# Patient Record
Sex: FEMALE | Race: BLACK OR AFRICAN AMERICAN | NOT HISPANIC OR LATINO | Employment: OTHER | URBAN - METROPOLITAN AREA
[De-identification: names, ages, dates, MRNs, and addresses within clinical notes are randomized per-mention and may not be internally consistent; named-entity substitution may affect disease eponyms.]

---

## 2017-09-12 ENCOUNTER — TELEPHONE (OUTPATIENT)
Dept: UROLOGY | Facility: CLINIC | Age: 62
End: 2017-09-12

## 2017-09-12 NOTE — TELEPHONE ENCOUNTER
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      Please call and advise.

## 2017-09-27 ENCOUNTER — OFFICE VISIT (OUTPATIENT)
Dept: UROLOGY | Facility: CLINIC | Age: 62
End: 2017-09-27
Payer: MEDICARE

## 2017-09-27 VITALS
SYSTOLIC BLOOD PRESSURE: 127 MMHG | DIASTOLIC BLOOD PRESSURE: 62 MMHG | WEIGHT: 166 LBS | BODY MASS INDEX: 29.41 KG/M2 | HEIGHT: 63 IN

## 2017-09-27 DIAGNOSIS — N39.3 SUI (STRESS URINARY INCONTINENCE, FEMALE): ICD-10-CM

## 2017-09-27 DIAGNOSIS — N32.81 OAB (OVERACTIVE BLADDER): ICD-10-CM

## 2017-09-27 DIAGNOSIS — R39.15 URINARY URGENCY: ICD-10-CM

## 2017-09-27 DIAGNOSIS — R35.0 URINARY FREQUENCY: ICD-10-CM

## 2017-09-27 DIAGNOSIS — N95.2 ATROPHIC VAGINITIS: ICD-10-CM

## 2017-09-27 DIAGNOSIS — R10.2 PELVIC PAIN IN FEMALE: ICD-10-CM

## 2017-09-27 DIAGNOSIS — N81.11 MIDLINE CYSTOCELE: ICD-10-CM

## 2017-09-27 PROBLEM — O26.899 PELVIC PAIN AFFECTING PREGNANCY: Status: ACTIVE | Noted: 2017-09-27

## 2017-09-27 PROCEDURE — 3008F BODY MASS INDEX DOCD: CPT | Mod: S$GLB,,, | Performed by: UROLOGY

## 2017-09-27 PROCEDURE — 99204 OFFICE O/P NEW MOD 45 MIN: CPT | Mod: S$GLB,,, | Performed by: UROLOGY

## 2017-09-27 PROCEDURE — 99999 PR PBB SHADOW E&M-EST. PATIENT-LVL III: CPT | Mod: PBBFAC,,, | Performed by: UROLOGY

## 2017-09-27 RX ORDER — BRIMONIDINE TARTRATE 2 MG/ML
SOLUTION/ DROPS OPHTHALMIC
COMMUNITY
Start: 2017-09-19 | End: 2022-03-08

## 2017-09-27 RX ORDER — ACETAZOLAMIDE 500 MG/1
CAPSULE, EXTENDED RELEASE ORAL
COMMUNITY
Start: 2017-09-19 | End: 2022-03-08

## 2017-09-27 RX ORDER — SOLIFENACIN SUCCINATE 5 MG/1
5 TABLET, FILM COATED ORAL DAILY
Qty: 30 TABLET | Refills: 11 | Status: SHIPPED | OUTPATIENT
Start: 2017-09-27 | End: 2022-03-08

## 2017-09-27 RX ORDER — CHOLECALCIFEROL (VITAMIN D3) 25 MCG
2000 TABLET ORAL DAILY
COMMUNITY

## 2017-09-27 RX ORDER — DORZOLAMIDE HCL 20 MG/ML
1 SOLUTION/ DROPS OPHTHALMIC 3 TIMES DAILY
COMMUNITY
End: 2022-03-08

## 2017-09-27 RX ORDER — ASPIRIN 81 MG/1
81 TABLET ORAL DAILY
COMMUNITY
End: 2022-06-17

## 2017-09-27 RX ORDER — ESTRADIOL 0.1 MG/G
1 CREAM VAGINAL DAILY
Qty: 42.5 G | Refills: 11 | Status: SHIPPED | OUTPATIENT
Start: 2017-09-27 | End: 2022-03-08

## 2017-09-27 NOTE — PROGRESS NOTES
Subjective:       Patient ID: Carina Rios is a 61 y.o. female.    Chief Complaint: Urinary Tract Infection    60 yo BF with 2-3 month history of urgency, vaginal discomfort and frequency. Here for evaluation.      Urinary Tract Infection    This is a new problem. The current episode started more than 1 month ago. The problem has been waxing and waning. The quality of the pain is described as burning. The pain is at a severity of 3/10. The pain is mild. There has been no fever. She is sexually active. There is no history of pyelonephritis. Associated symptoms include frequency, urgency and constipation. Pertinent negatives include no behavior changes, chills, discharge, flank pain, hematuria, hesitancy, nausea, possible pregnancy, sweats, vomiting, weight loss, bubble bath use, rash or withholding. She has tried antibiotics for the symptoms. The treatment provided moderate relief. There is no history of catheterization, diabetes insipidus, diabetes mellitus, genitourinary reflux, hypertension, kidney stones, recurrent UTIs, a single kidney, STD, urinary stasis or a urological procedure.     Review of Systems   Constitutional: Negative for activity change, appetite change, chills, fatigue, fever and weight loss.   HENT: Negative for congestion, ear pain, hearing loss, nosebleeds, sinus pressure, sore throat and trouble swallowing.    Eyes: Negative for pain and visual disturbance.   Respiratory: Negative for apnea, cough and shortness of breath.    Cardiovascular: Negative for chest pain and leg swelling.   Gastrointestinal: Positive for constipation. Negative for abdominal distention, abdominal pain, anal bleeding, blood in stool, diarrhea, nausea, rectal pain and vomiting.   Endocrine: Negative for cold intolerance, heat intolerance, polydipsia, polyphagia and polyuria.   Genitourinary: Positive for frequency and urgency. Negative for decreased urine volume, difficulty urinating, dyspareunia, dysuria, enuresis,  flank pain, genital sores, hematuria, hesitancy, menstrual problem, pelvic pain, vaginal bleeding, vaginal discharge and vaginal pain.   Musculoskeletal: Negative for arthralgias and back pain.   Skin: Negative for color change, pallor and rash.   Allergic/Immunologic: Negative for environmental allergies, food allergies and immunocompromised state.   Neurological: Negative for dizziness, speech difficulty, weakness and headaches.   Hematological: Negative for adenopathy. Does not bruise/bleed easily.   Psychiatric/Behavioral: Negative.        Objective:      Physical Exam   Nursing note and vitals reviewed.  Constitutional: She is oriented to person, place, and time. She appears well-developed and well-nourished.   HENT:   Head: Normocephalic.   Nose: Nose normal.   Mouth/Throat: Oropharynx is clear and moist.   Eyes: Conjunctivae and EOM are normal. Pupils are equal, round, and reactive to light.   Neck: Normal range of motion. Neck supple.   Cardiovascular: Normal rate, regular rhythm, normal heart sounds and intact distal pulses.    Pulmonary/Chest: Effort normal and breath sounds normal.   Abdominal: Soft. Bowel sounds are normal.   Genitourinary: No breast swelling, tenderness, discharge or bleeding. No labial fusion. There is no rash, tenderness, lesion or injury on the right labia. There is no rash, tenderness, lesion or injury on the left labia. Right adnexum displays no mass, no tenderness and no fullness. Left adnexum displays no mass, no tenderness and no fullness. No erythema, tenderness or bleeding in the vagina. No foreign body in the vagina. No signs of injury around the vagina. No vaginal discharge found.   Genitourinary Comments: S/P KADEN and BSO, Atrophic vaginitis with Grade 2 cystocele.   Musculoskeletal: Normal range of motion.   Neurological: She is alert and oriented to person, place, and time. She has normal reflexes.   Skin: Skin is warm and dry.     Psychiatric: She has a normal mood and  affect. Her behavior is normal. Judgment and thought content normal.       Assessment:       1. Pelvic pain in female    2. Urinary frequency    3. Urinary urgency    4. ML (stress urinary incontinence, female)    5. Midline cystocele    6. Atrophic vaginitis    7. OAB (overactive bladder)        Plan:       Patient Instructions   Trial of Estrace  Trial of Vesicare  F/U 6 weeks

## 2018-04-17 DIAGNOSIS — Z12.31 SCREENING MAMMOGRAM, ENCOUNTER FOR: Primary | ICD-10-CM

## 2018-04-18 ENCOUNTER — HOSPITAL ENCOUNTER (OUTPATIENT)
Dept: RADIOLOGY | Facility: HOSPITAL | Age: 63
Discharge: HOME | End: 2018-04-18
Attending: OBSTETRICS & GYNECOLOGY
Payer: MEDICARE

## 2018-04-18 DIAGNOSIS — Z12.31 SCREENING MAMMOGRAM, ENCOUNTER FOR: ICD-10-CM

## 2018-04-18 PROCEDURE — 77067 SCR MAMMO BI INCL CAD: CPT | Mod: TC

## 2018-04-18 PROCEDURE — 77063 BREAST TOMOSYNTHESIS BI: CPT | Mod: 26,,, | Performed by: RADIOLOGY

## 2018-04-18 PROCEDURE — 77067 SCR MAMMO BI INCL CAD: CPT | Mod: 26,,, | Performed by: RADIOLOGY

## 2019-11-30 ENCOUNTER — OFFICE VISIT (OUTPATIENT)
Dept: URGENT CARE | Facility: CLINIC | Age: 64
End: 2019-11-30
Payer: MEDICARE

## 2019-11-30 VITALS
HEART RATE: 69 BPM | BODY MASS INDEX: 30.48 KG/M2 | HEIGHT: 63 IN | SYSTOLIC BLOOD PRESSURE: 138 MMHG | OXYGEN SATURATION: 99 % | DIASTOLIC BLOOD PRESSURE: 68 MMHG | WEIGHT: 172 LBS | TEMPERATURE: 96 F

## 2019-11-30 DIAGNOSIS — M54.6 ACUTE RIGHT-SIDED THORACIC BACK PAIN: Primary | ICD-10-CM

## 2019-11-30 LAB
BILIRUB UR QL STRIP: POSITIVE
GLUCOSE UR QL STRIP: NEGATIVE
KETONES UR QL STRIP: NEGATIVE
LEUKOCYTE ESTERASE UR QL STRIP: NEGATIVE
PH, POC UA: 7.5
POC BLOOD, URINE: NEGATIVE
POC NITRATES, URINE: NEGATIVE
PROT UR QL STRIP: NEGATIVE
SP GR UR STRIP: 1.01 (ref 1–1.03)
UROBILINOGEN UR STRIP-ACNC: ABNORMAL (ref 0.1–1.1)

## 2019-11-30 PROCEDURE — 99214 OFFICE O/P EST MOD 30 MIN: CPT | Mod: 25,S$GLB,, | Performed by: NURSE PRACTITIONER

## 2019-11-30 PROCEDURE — 81003 POCT URINALYSIS, DIPSTICK, AUTOMATED, W/O SCOPE: ICD-10-PCS | Mod: QW,S$GLB,, | Performed by: NURSE PRACTITIONER

## 2019-11-30 PROCEDURE — 99214 PR OFFICE/OUTPT VISIT, EST, LEVL IV, 30-39 MIN: ICD-10-PCS | Mod: 25,S$GLB,, | Performed by: NURSE PRACTITIONER

## 2019-11-30 PROCEDURE — 96372 PR INJECTION,THERAP/PROPH/DIAG2ST, IM OR SUBCUT: ICD-10-PCS | Mod: S$GLB,,, | Performed by: NURSE PRACTITIONER

## 2019-11-30 PROCEDURE — 81003 URINALYSIS AUTO W/O SCOPE: CPT | Mod: QW,S$GLB,, | Performed by: NURSE PRACTITIONER

## 2019-11-30 PROCEDURE — 96372 THER/PROPH/DIAG INJ SC/IM: CPT | Mod: S$GLB,,, | Performed by: NURSE PRACTITIONER

## 2019-11-30 RX ORDER — KETOROLAC TROMETHAMINE 30 MG/ML
30 INJECTION, SOLUTION INTRAMUSCULAR; INTRAVENOUS
Status: COMPLETED | OUTPATIENT
Start: 2019-11-30 | End: 2019-11-30

## 2019-11-30 RX ORDER — METHOCARBAMOL 500 MG/1
500 TABLET, FILM COATED ORAL 3 TIMES DAILY
Qty: 21 TABLET | Refills: 0 | Status: SHIPPED | OUTPATIENT
Start: 2019-11-30 | End: 2019-12-07

## 2019-11-30 RX ORDER — NAPROXEN 500 MG/1
500 TABLET ORAL 2 TIMES DAILY WITH MEALS
Qty: 20 TABLET | Refills: 0 | Status: SHIPPED | OUTPATIENT
Start: 2019-11-30 | End: 2019-12-10

## 2019-11-30 RX ORDER — BRIMONIDINE TARTRATE 1 MG/ML
SOLUTION/ DROPS OPHTHALMIC
Refills: 3 | COMMUNITY
Start: 2019-09-02 | End: 2022-03-08 | Stop reason: ALTCHOICE

## 2019-11-30 RX ORDER — AMLODIPINE BESYLATE 5 MG/1
5 TABLET ORAL DAILY
Refills: 2 | COMMUNITY
Start: 2019-09-26 | End: 2022-06-17

## 2019-11-30 RX ORDER — PREDNISOLONE ACETATE 10 MG/ML
SUSPENSION/ DROPS OPHTHALMIC
Refills: 1 | COMMUNITY
Start: 2019-11-05 | End: 2022-03-08

## 2019-11-30 RX ADMIN — KETOROLAC TROMETHAMINE 30 MG: 30 INJECTION, SOLUTION INTRAMUSCULAR; INTRAVENOUS at 02:11

## 2019-11-30 NOTE — PROGRESS NOTES
"Subjective:       Patient ID: Carina Rios is a 64 y.o. female.    Vitals:  height is 5' 3" (1.6 m) and weight is 78 kg (172 lb). Her temperature is 96.4 °F (35.8 °C). Her blood pressure is 138/68 and her pulse is 69. Her oxygen saturation is 99%.     Chief Complaint: Back Pain    Back Pain   The current episode started in the past 7 days. The problem occurs constantly. The problem has been gradually worsening since onset. The pain is present in the thoracic spine. The quality of the pain is described as burning. The pain is at a severity of 8/10. The pain is moderate. The pain is worse during the night. The symptoms are aggravated by twisting, bending, lying down and sitting. Stiffness is present at night. Treatments tried: biofreeze and ibuprofen. The treatment provided mild relief.       Respiratory: Positive for chest tightness.    Musculoskeletal: Positive for back pain (middle back radiating from under rib cage).       Objective:      Physical Exam   Constitutional: She is oriented to person, place, and time. Vital signs are normal. She appears well-developed and well-nourished. She is active and cooperative. No distress.   HENT:   Head: Normocephalic and atraumatic.   Nose: Nose normal.   Eyes: Conjunctivae and lids are normal.   Cardiovascular: Normal rate, regular rhythm, normal heart sounds, intact distal pulses and normal pulses.   Pulmonary/Chest: Effort normal and breath sounds normal.   Musculoskeletal: She exhibits no edema or deformity.        Thoracic back: She exhibits decreased range of motion, tenderness (TTP, see diagram), pain and spasm. She exhibits no bony tenderness, no swelling, no edema, no deformity, no laceration and normal pulse.        Back:    Neurological: She is alert and oriented to person, place, and time. She has normal strength and normal reflexes. She is not disoriented. No sensory deficit.   Skin: Skin is warm, dry, intact and not diaphoretic.   Psychiatric: She has a " normal mood and affect. Her speech is normal and behavior is normal. Judgment and thought content normal. Cognition and memory are normal.   Nursing note and vitals reviewed.        Assessment:       1. Acute right-sided thoracic back pain        Plan:         Acute right-sided thoracic back pain  -     POCT Urinalysis, Dipstick, Automated, W/O Scope  -     ketorolac injection 30 mg  -     naproxen (NAPROSYN) 500 MG tablet; Take 1 tablet (500 mg total) by mouth 2 (two) times daily with meals. As needed for pain. for 10 days  Dispense: 20 tablet; Refill: 0  -     methocarbamol (ROBAXIN) 500 MG Tab; Take 1 tablet (500 mg total) by mouth 3 (three) times daily. As needed for muscle spasm. May cause drowsiness for 7 days  Dispense: 21 tablet; Refill: 0      Patient Instructions   Please follow up with your Primary care provider within 2-5 days if your signs and symptoms have not resolved or worsen.     If your condition worsens or fails to improve we recommend that you receive another evaluation at the emergency room immediately or contact your primary medical clinic to discuss your concerns.    You must understand that you have received an Urgent Care treatment only and that you may be released before all of your medical problems are known or treated.   You, the patient, will arrange for follow up care as instructed.     ORTHO    R.I.C.E. to the affected joint or limb as needed:    Rest- the injured or sore extremity.  Ice- for the next 24-48 hours, alternating 20 minutes on and 20 minutes off as needed up to 3 times a day.   Compression-Use bandages to stabilize injured limb.  Check circulation to make sure  bandage is not too tight.    Elevate-when possible to reduce swelling.      Ice 20 minutes on and 20 minutes off as needed for pain.     Please drink plenty of fluids.    Please get plenty of rest.    Please return here or go to the Emergency Department for any concerns or worsening of condition.    Please be aware  that narcotics can be addictive. If I gave you narcotics, I have given you a limited quantity to take as it is needed at this time. However take it sparingly and only when needed.  Do not operate machinery or drive on this medication.      If you were not prescribed an anti-inflammatory medication, and if you do not have any history of stomach/intestinal ulcers, or kidney disease, or are not taking a blood thinner such as Coumadin, Plavix, Pradaxa, Eloquis, or Xaralta for example, it is OK to take over the counter Ibuprofen or Advil or Motrin or Aleve as directed.  Do not take these medications on an empty stomach.    If you were given a splint wear it at all times unless otherwise instructed.     If you were given crutches use them as we instructed. Do not rest your armpits on the foam pad or you risk compressing the nerves and the vessels there.    Please follow up with your primary care doctor or specialist as needed.    If you lose control of your bowel and/or bladder, please go to the nearest Emergency Department immediately.  If you lose sensation in between your legs by your genitalia and/or rectum, please go to the nearest Emergency Department immediately.  If you lose control or sensation of any extremity, please go to the nearest Emergency Department immediately.      Back Sprain or Strain    Injury to the muscles (strain) or ligaments (sprain) around the spine can be troubling. Injury may occur after a sudden forceful twisting or bending force such as in a car accident, after a simple awkward movement, or after lifting something heavy with poor body positioning. In any case, muscle spasm is often present and adds to the pain.  Thankfully, most people feel better in 1 to 2 weeks, and most of the rest in 1 to 2 months. Most people can remain active. Unless you had a forceful or traumatic physical injury such as a car accident or fall, X-rays may not be ordered for the first evaluation of a back sprain or  strain. If pain continues and does not respond to medical treatment, your healthcare provider may then order X-rays and other tests.  Home care  The following guidelines will help you care for your injury at home:  · When in bed, try to find a comfortable position. A firm mattress is best. Try lying flat on your back with pillows under your knees. You can also try lying on your side with your knees bent up toward your chest and a pillow between your knees.  · Don't sit for long periods. Try not to take long car rides or take other trips that have you sitting for a long time. This puts more stress on the lower back than standing or walking.  · During the first 24 to 72 hours after an injury or flare-up, apply an ice pack to the painful area for 20 minutes. Then remove it for 20 minutes. Do this for 60 to 90 minutes, or several times a day. This will reduce swelling and pain. Be sure to wrap the ice pack in a thin towel or plastic to protect your skin.  · You can start with ice, then switch to heat. Heat from a hot shower, hot bath, or heating pad reduces pain and works well for muscle spasms. Put heat on the painful area for 20 minutes, then remove for 20 minutes. Do this for 60 to 90 minutes, or several times a day. Do not use a heating pad while sleeping. It can burn the skin.  · You can alternate the ice and heat. Talk with your healthcare provider to find out the best treatment or therapy for your back pain.  · Therapeutic massage will help relax the back muscles without stretching them.  · Be aware of safe lifting methods. Do not lift anything over 15 pounds until all of the pain is gone.  Medicines  Talk to your healthcare provider before using medicines, especially if you have other health problems or are taking other medicines.  · You may use acetaminophen or ibuprofen to control pain, unless another pain medicine was prescribed. If you have chronic conditions like diabetes, liver or kidney disease, stomach  ulcers, or gastrointestinal bleeding, or are taking blood-thinner medicines, talk with your doctor before taking any medicines.  · Be careful if you are given prescription medicines, narcotics, or medicine for muscle spasm. They can cause drowsiness, and affect your coordination, reflexes, and judgment. Do not drive or operate heavy machinery when taking these types of medicines. Only take pain medicine as prescribed by your healthcare provider.  Follow-up care  Follow up with your healthcare provider, or as advised. You may need physical therapy or more tests if your symptoms get worse.  If you had X-rays your healthcare provider may be checking for any broken bones, breaks, or fractures. Bruises and sprains can sometimes hurt as much as a fracture. These injuries can take time to heal completely. If your symptoms dont improve or they get worse, talk with your healthcare provider. You may need a repeat X-ray or other tests.  Call 911  Call for emergency care if any of the following occur:  · Trouble breathing  · Confused  · Very drowsy or trouble awakening  · Fainting or loss of consciousness  · Rapid or very slow heart rate  · Loss of bowel or bladder control  When to seek medical advice  Call your healthcare provider right away if any of the following occur:  · Pain gets worse or spreads to your arms or legs  · Weakness or numbness in one or both arms or legs  · Numbness in the groin or genital area  Date Last Reviewed: 6/1/2016  © 0334-6133 [employer]. [address] [place] [address]. All rights reserved. This information is not intended as a substitute for professional medical care. Always follow your healthcare professional's instructions.

## 2019-11-30 NOTE — PATIENT INSTRUCTIONS
Please follow up with your Primary care provider within 2-5 days if your signs and symptoms have not resolved or worsen.     If your condition worsens or fails to improve we recommend that you receive another evaluation at the emergency room immediately or contact your primary medical clinic to discuss your concerns.    You must understand that you have received an Urgent Care treatment only and that you may be released before all of your medical problems are known or treated.   You, the patient, will arrange for follow up care as instructed.     ORTHO    R.I.C.E. to the affected joint or limb as needed:    Rest- the injured or sore extremity.  Ice- for the next 24-48 hours, alternating 20 minutes on and 20 minutes off as needed up to 3 times a day.   Compression-Use bandages to stabilize injured limb.  Check circulation to make sure  bandage is not too tight.    Elevate-when possible to reduce swelling.      Ice 20 minutes on and 20 minutes off as needed for pain.     Please drink plenty of fluids.    Please get plenty of rest.    Please return here or go to the Emergency Department for any concerns or worsening of condition.    Please be aware that narcotics can be addictive. If I gave you narcotics, I have given you a limited quantity to take as it is needed at this time. However take it sparingly and only when needed.  Do not operate machinery or drive on this medication.      If you were not prescribed an anti-inflammatory medication, and if you do not have any history of stomach/intestinal ulcers, or kidney disease, or are not taking a blood thinner such as Coumadin, Plavix, Pradaxa, Eloquis, or Xaralta for example, it is OK to take over the counter Ibuprofen or Advil or Motrin or Aleve as directed.  Do not take these medications on an empty stomach.    If you were given a splint wear it at all times unless otherwise instructed.     If you were given crutches use them as we instructed. Do not rest your armpits  on the foam pad or you risk compressing the nerves and the vessels there.    Please follow up with your primary care doctor or specialist as needed.    If you lose control of your bowel and/or bladder, please go to the nearest Emergency Department immediately.  If you lose sensation in between your legs by your genitalia and/or rectum, please go to the nearest Emergency Department immediately.  If you lose control or sensation of any extremity, please go to the nearest Emergency Department immediately.      Back Sprain or Strain    Injury to the muscles (strain) or ligaments (sprain) around the spine can be troubling. Injury may occur after a sudden forceful twisting or bending force such as in a car accident, after a simple awkward movement, or after lifting something heavy with poor body positioning. In any case, muscle spasm is often present and adds to the pain.  Thankfully, most people feel better in 1 to 2 weeks, and most of the rest in 1 to 2 months. Most people can remain active. Unless you had a forceful or traumatic physical injury such as a car accident or fall, X-rays may not be ordered for the first evaluation of a back sprain or strain. If pain continues and does not respond to medical treatment, your healthcare provider may then order X-rays and other tests.  Home care  The following guidelines will help you care for your injury at home:  · When in bed, try to find a comfortable position. A firm mattress is best. Try lying flat on your back with pillows under your knees. You can also try lying on your side with your knees bent up toward your chest and a pillow between your knees.  · Don't sit for long periods. Try not to take long car rides or take other trips that have you sitting for a long time. This puts more stress on the lower back than standing or walking.  · During the first 24 to 72 hours after an injury or flare-up, apply an ice pack to the painful area for 20 minutes. Then remove it for 20  minutes. Do this for 60 to 90 minutes, or several times a day. This will reduce swelling and pain. Be sure to wrap the ice pack in a thin towel or plastic to protect your skin.  · You can start with ice, then switch to heat. Heat from a hot shower, hot bath, or heating pad reduces pain and works well for muscle spasms. Put heat on the painful area for 20 minutes, then remove for 20 minutes. Do this for 60 to 90 minutes, or several times a day. Do not use a heating pad while sleeping. It can burn the skin.  · You can alternate the ice and heat. Talk with your healthcare provider to find out the best treatment or therapy for your back pain.  · Therapeutic massage will help relax the back muscles without stretching them.  · Be aware of safe lifting methods. Do not lift anything over 15 pounds until all of the pain is gone.  Medicines  Talk to your healthcare provider before using medicines, especially if you have other health problems or are taking other medicines.  · You may use acetaminophen or ibuprofen to control pain, unless another pain medicine was prescribed. If you have chronic conditions like diabetes, liver or kidney disease, stomach ulcers, or gastrointestinal bleeding, or are taking blood-thinner medicines, talk with your doctor before taking any medicines.  · Be careful if you are given prescription medicines, narcotics, or medicine for muscle spasm. They can cause drowsiness, and affect your coordination, reflexes, and judgment. Do not drive or operate heavy machinery when taking these types of medicines. Only take pain medicine as prescribed by your healthcare provider.  Follow-up care  Follow up with your healthcare provider, or as advised. You may need physical therapy or more tests if your symptoms get worse.  If you had X-rays your healthcare provider may be checking for any broken bones, breaks, or fractures. Bruises and sprains can sometimes hurt as much as a fracture. These injuries can take time  to heal completely. If your symptoms dont improve or they get worse, talk with your healthcare provider. You may need a repeat X-ray or other tests.  Call 911  Call for emergency care if any of the following occur:  · Trouble breathing  · Confused  · Very drowsy or trouble awakening  · Fainting or loss of consciousness  · Rapid or very slow heart rate  · Loss of bowel or bladder control  When to seek medical advice  Call your healthcare provider right away if any of the following occur:  · Pain gets worse or spreads to your arms or legs  · Weakness or numbness in one or both arms or legs  · Numbness in the groin or genital area  Date Last Reviewed: 6/1/2016  © 4935-6350 [employer]. [address] [place] [address], [place] [address]. All rights reserved. This information is not intended as a substitute for professional medical care. Always follow your healthcare professional's instructions.

## 2019-12-03 ENCOUNTER — TELEPHONE (OUTPATIENT)
Dept: URGENT CARE | Facility: CLINIC | Age: 64
End: 2019-12-03

## 2021-03-08 ENCOUNTER — CLINICAL SUPPORT (OUTPATIENT)
Dept: SMOKING CESSATION | Facility: CLINIC | Age: 66
End: 2021-03-08
Payer: COMMERCIAL

## 2021-03-08 DIAGNOSIS — F17.210 CIGARETTE SMOKER: Primary | ICD-10-CM

## 2021-03-08 PROCEDURE — 99407 PR TOBACCO USE CESSATION INTENSIVE >10 MINUTES: ICD-10-PCS | Mod: S$GLB,,,

## 2021-03-08 PROCEDURE — 99999 PR PBB SHADOW E&M-EST. PATIENT-LVL I: CPT | Mod: PBBFAC,,,

## 2021-03-08 PROCEDURE — 99999 PR PBB SHADOW E&M-EST. PATIENT-LVL I: ICD-10-PCS | Mod: PBBFAC,,,

## 2021-03-08 PROCEDURE — 99407 BEHAV CHNG SMOKING > 10 MIN: CPT | Mod: S$GLB,,,

## 2021-03-25 ENCOUNTER — TELEPHONE (OUTPATIENT)
Dept: SMOKING CESSATION | Facility: CLINIC | Age: 66
End: 2021-03-25

## 2021-04-13 PROCEDURE — 96361 HYDRATE IV INFUSION ADD-ON: CPT

## 2021-04-13 PROCEDURE — 99285 EMERGENCY DEPT VISIT HI MDM: CPT | Mod: 25

## 2021-04-13 PROCEDURE — 96374 THER/PROPH/DIAG INJ IV PUSH: CPT

## 2021-04-14 ENCOUNTER — HOSPITAL ENCOUNTER (EMERGENCY)
Facility: HOSPITAL | Age: 66
Discharge: HOME | End: 2021-04-14
Attending: EMERGENCY MEDICINE
Payer: MEDICARE

## 2021-04-14 VITALS
TEMPERATURE: 98 F | RESPIRATION RATE: 18 BRPM | HEART RATE: 71 BPM | BODY MASS INDEX: 27.66 KG/M2 | WEIGHT: 162 LBS | HEIGHT: 64 IN | OXYGEN SATURATION: 99 % | SYSTOLIC BLOOD PRESSURE: 133 MMHG | DIASTOLIC BLOOD PRESSURE: 66 MMHG

## 2021-04-14 DIAGNOSIS — R19.7 VOMITING AND DIARRHEA: ICD-10-CM

## 2021-04-14 DIAGNOSIS — R42 ORTHOSTATIC DIZZINESS: Primary | ICD-10-CM

## 2021-04-14 DIAGNOSIS — R55 SYNCOPE: ICD-10-CM

## 2021-04-14 DIAGNOSIS — R11.10 VOMITING AND DIARRHEA: ICD-10-CM

## 2021-04-14 LAB
ALBUMIN SERPL BCP-MCNC: 4.4 G/DL (ref 3.5–5.2)
ALP SERPL-CCNC: 103 U/L (ref 55–135)
ALT SERPL W/O P-5'-P-CCNC: 23 U/L (ref 10–44)
ANION GAP SERPL CALC-SCNC: 11 MMOL/L (ref 8–16)
AST SERPL-CCNC: 23 U/L (ref 10–40)
BASOPHILS # BLD AUTO: 0.04 K/UL (ref 0–0.2)
BASOPHILS NFR BLD: 0.4 % (ref 0–1.9)
BILIRUB SERPL-MCNC: 0.3 MG/DL (ref 0.1–1)
BILIRUB UR QL STRIP: NEGATIVE
BNP SERPL-MCNC: 12 PG/ML (ref 0–99)
BUN SERPL-MCNC: 14 MG/DL (ref 8–23)
CALCIUM SERPL-MCNC: 9.7 MG/DL (ref 8.7–10.5)
CHLORIDE SERPL-SCNC: 106 MMOL/L (ref 95–110)
CLARITY UR: CLEAR
CO2 SERPL-SCNC: 23 MMOL/L (ref 23–29)
COLOR UR: YELLOW
CREAT SERPL-MCNC: 0.8 MG/DL (ref 0.5–1.4)
DIFFERENTIAL METHOD: ABNORMAL
EOSINOPHIL # BLD AUTO: 0 K/UL (ref 0–0.5)
EOSINOPHIL NFR BLD: 0.3 % (ref 0–8)
ERYTHROCYTE [DISTWIDTH] IN BLOOD BY AUTOMATED COUNT: 12.8 % (ref 11.5–14.5)
EST. GFR  (AFRICAN AMERICAN): >60 ML/MIN/1.73 M^2
EST. GFR  (NON AFRICAN AMERICAN): >60 ML/MIN/1.73 M^2
GLUCOSE SERPL-MCNC: 115 MG/DL (ref 70–110)
GLUCOSE UR QL STRIP: NEGATIVE
HCT VFR BLD AUTO: 41.8 % (ref 37–48.5)
HGB BLD-MCNC: 13.5 G/DL (ref 12–16)
HGB UR QL STRIP: NEGATIVE
IMM GRANULOCYTES # BLD AUTO: 0.04 K/UL (ref 0–0.04)
IMM GRANULOCYTES NFR BLD AUTO: 0.4 % (ref 0–0.5)
KETONES UR QL STRIP: NEGATIVE
LEUKOCYTE ESTERASE UR QL STRIP: NEGATIVE
LYMPHOCYTES # BLD AUTO: 1.5 K/UL (ref 1–4.8)
LYMPHOCYTES NFR BLD: 13.5 % (ref 18–48)
MCH RBC QN AUTO: 31.6 PG (ref 27–31)
MCHC RBC AUTO-ENTMCNC: 32.3 G/DL (ref 32–36)
MCV RBC AUTO: 98 FL (ref 82–98)
MONOCYTES # BLD AUTO: 0.3 K/UL (ref 0.3–1)
MONOCYTES NFR BLD: 3.1 % (ref 4–15)
NEUTROPHILS # BLD AUTO: 8.9 K/UL (ref 1.8–7.7)
NEUTROPHILS NFR BLD: 82.3 % (ref 38–73)
NITRITE UR QL STRIP: NEGATIVE
NRBC BLD-RTO: 0 /100 WBC
PH UR STRIP: 6 [PH] (ref 5–8)
PLATELET # BLD AUTO: 167 K/UL (ref 150–450)
PMV BLD AUTO: 13.3 FL (ref 9.2–12.9)
POTASSIUM SERPL-SCNC: 4.3 MMOL/L (ref 3.5–5.1)
PROT SERPL-MCNC: 8.5 G/DL (ref 6–8.4)
PROT UR QL STRIP: NEGATIVE
RBC # BLD AUTO: 4.27 M/UL (ref 4–5.4)
SODIUM SERPL-SCNC: 140 MMOL/L (ref 136–145)
SP GR UR STRIP: 1.02 (ref 1–1.03)
TROPONIN I SERPL DL<=0.01 NG/ML-MCNC: <0.006 NG/ML (ref 0–0.03)
URN SPEC COLLECT METH UR: NORMAL
UROBILINOGEN UR STRIP-ACNC: NEGATIVE EU/DL
WBC # BLD AUTO: 10.8 K/UL (ref 3.9–12.7)

## 2021-04-14 PROCEDURE — 93005 ELECTROCARDIOGRAM TRACING: CPT

## 2021-04-14 PROCEDURE — 63600175 PHARM REV CODE 636 W HCPCS: Performed by: EMERGENCY MEDICINE

## 2021-04-14 PROCEDURE — 85025 COMPLETE CBC W/AUTO DIFF WBC: CPT | Performed by: PHYSICIAN ASSISTANT

## 2021-04-14 PROCEDURE — 80053 COMPREHEN METABOLIC PANEL: CPT | Performed by: PHYSICIAN ASSISTANT

## 2021-04-14 PROCEDURE — 83880 ASSAY OF NATRIURETIC PEPTIDE: CPT | Performed by: PHYSICIAN ASSISTANT

## 2021-04-14 PROCEDURE — 81003 URINALYSIS AUTO W/O SCOPE: CPT | Performed by: PHYSICIAN ASSISTANT

## 2021-04-14 PROCEDURE — 84484 ASSAY OF TROPONIN QUANT: CPT | Performed by: PHYSICIAN ASSISTANT

## 2021-04-14 PROCEDURE — 93010 EKG 12-LEAD: ICD-10-PCS | Mod: ,,, | Performed by: INTERNAL MEDICINE

## 2021-04-14 PROCEDURE — 93010 ELECTROCARDIOGRAM REPORT: CPT | Mod: ,,, | Performed by: INTERNAL MEDICINE

## 2021-04-14 PROCEDURE — 25000003 PHARM REV CODE 250: Performed by: EMERGENCY MEDICINE

## 2021-04-14 RX ORDER — ACETAMINOPHEN 500 MG
1000 TABLET ORAL
Status: COMPLETED | OUTPATIENT
Start: 2021-04-14 | End: 2021-04-14

## 2021-04-14 RX ORDER — DICYCLOMINE HYDROCHLORIDE 20 MG/1
20 TABLET ORAL 2 TIMES DAILY PRN
Qty: 10 TABLET | Refills: 0 | Status: SHIPPED | OUTPATIENT
Start: 2021-04-14 | End: 2021-04-19

## 2021-04-14 RX ORDER — ONDANSETRON 2 MG/ML
4 INJECTION INTRAMUSCULAR; INTRAVENOUS
Status: COMPLETED | OUTPATIENT
Start: 2021-04-14 | End: 2021-04-14

## 2021-04-14 RX ORDER — ONDANSETRON 4 MG/1
4 TABLET, ORALLY DISINTEGRATING ORAL EVERY 8 HOURS PRN
Qty: 12 TABLET | Refills: 0 | Status: SHIPPED | OUTPATIENT
Start: 2021-04-14 | End: 2022-03-08

## 2021-04-14 RX ADMIN — ONDANSETRON 4 MG: 2 INJECTION INTRAMUSCULAR; INTRAVENOUS at 02:04

## 2021-04-14 RX ADMIN — SODIUM CHLORIDE, SODIUM LACTATE, POTASSIUM CHLORIDE, AND CALCIUM CHLORIDE 1000 ML: .6; .31; .03; .02 INJECTION, SOLUTION INTRAVENOUS at 02:04

## 2021-04-14 RX ADMIN — ACETAMINOPHEN 1000 MG: 500 TABLET ORAL at 02:04

## 2021-04-21 ENCOUNTER — TELEPHONE (OUTPATIENT)
Dept: SMOKING CESSATION | Facility: CLINIC | Age: 66
End: 2021-04-21

## 2021-08-20 ENCOUNTER — LAB VISIT (OUTPATIENT)
Dept: PRIMARY CARE CLINIC | Facility: OTHER | Age: 66
End: 2021-08-20
Payer: MEDICARE

## 2021-08-20 DIAGNOSIS — R05.9 COUGH: ICD-10-CM

## 2021-08-20 PROCEDURE — U0003 INFECTIOUS AGENT DETECTION BY NUCLEIC ACID (DNA OR RNA); SEVERE ACUTE RESPIRATORY SYNDROME CORONAVIRUS 2 (SARS-COV-2) (CORONAVIRUS DISEASE [COVID-19]), AMPLIFIED PROBE TECHNIQUE, MAKING USE OF HIGH THROUGHPUT TECHNOLOGIES AS DESCRIBED BY CMS-2020-01-R: HCPCS | Performed by: NURSE PRACTITIONER

## 2021-08-23 LAB
SARS-COV-2 RNA RESP QL NAA+PROBE: NOT DETECTED
SARS-COV-2- CYCLE NUMBER: NORMAL

## 2022-03-08 ENCOUNTER — OFFICE VISIT (OUTPATIENT)
Dept: OPHTHALMOLOGY | Facility: CLINIC | Age: 67
End: 2022-03-08
Payer: MEDICARE

## 2022-03-08 DIAGNOSIS — Z96.1 PSEUDOPHAKIA OF BOTH EYES: ICD-10-CM

## 2022-03-08 DIAGNOSIS — Q15.0 JUVENILE GLAUCOMA: ICD-10-CM

## 2022-03-08 DIAGNOSIS — H40.1113 PRIMARY OPEN ANGLE GLAUCOMA (POAG) OF RIGHT EYE, SEVERE STAGE: ICD-10-CM

## 2022-03-08 DIAGNOSIS — H02.883 MEIBOMIAN GLAND DYSFUNCTION (MGD) OF BOTH EYES: ICD-10-CM

## 2022-03-08 DIAGNOSIS — H40.1123 PRIMARY OPEN ANGLE GLAUCOMA (POAG) OF LEFT EYE, SEVERE STAGE: Primary | ICD-10-CM

## 2022-03-08 DIAGNOSIS — H02.886 MEIBOMIAN GLAND DYSFUNCTION (MGD) OF BOTH EYES: ICD-10-CM

## 2022-03-08 PROCEDURE — 99999 PR PBB SHADOW E&M-EST. PATIENT-LVL II: ICD-10-PCS | Mod: PBBFAC,,, | Performed by: STUDENT IN AN ORGANIZED HEALTH CARE EDUCATION/TRAINING PROGRAM

## 2022-03-08 PROCEDURE — 76514 PR  US, EYE, FOR CORNEAL THICKNESS: ICD-10-PCS | Mod: S$GLB,,, | Performed by: STUDENT IN AN ORGANIZED HEALTH CARE EDUCATION/TRAINING PROGRAM

## 2022-03-08 PROCEDURE — 1160F PR REVIEW ALL MEDS BY PRESCRIBER/CLIN PHARMACIST DOCUMENTED: ICD-10-PCS | Mod: CPTII,S$GLB,, | Performed by: STUDENT IN AN ORGANIZED HEALTH CARE EDUCATION/TRAINING PROGRAM

## 2022-03-08 PROCEDURE — 99204 OFFICE O/P NEW MOD 45 MIN: CPT | Mod: S$GLB,,, | Performed by: STUDENT IN AN ORGANIZED HEALTH CARE EDUCATION/TRAINING PROGRAM

## 2022-03-08 PROCEDURE — 76514 ECHO EXAM OF EYE THICKNESS: CPT | Mod: S$GLB,,, | Performed by: STUDENT IN AN ORGANIZED HEALTH CARE EDUCATION/TRAINING PROGRAM

## 2022-03-08 PROCEDURE — 92020 PR SPECIAL EYE EVAL,GONIOSCOPY: ICD-10-PCS | Mod: S$GLB,,, | Performed by: STUDENT IN AN ORGANIZED HEALTH CARE EDUCATION/TRAINING PROGRAM

## 2022-03-08 PROCEDURE — 99204 PR OFFICE/OUTPT VISIT, NEW, LEVL IV, 45-59 MIN: ICD-10-PCS | Mod: S$GLB,,, | Performed by: STUDENT IN AN ORGANIZED HEALTH CARE EDUCATION/TRAINING PROGRAM

## 2022-03-08 PROCEDURE — 1159F PR MEDICATION LIST DOCUMENTED IN MEDICAL RECORD: ICD-10-PCS | Mod: CPTII,S$GLB,, | Performed by: STUDENT IN AN ORGANIZED HEALTH CARE EDUCATION/TRAINING PROGRAM

## 2022-03-08 PROCEDURE — 1159F MED LIST DOCD IN RCRD: CPT | Mod: CPTII,S$GLB,, | Performed by: STUDENT IN AN ORGANIZED HEALTH CARE EDUCATION/TRAINING PROGRAM

## 2022-03-08 PROCEDURE — 92020 GONIOSCOPY: CPT | Mod: S$GLB,,, | Performed by: STUDENT IN AN ORGANIZED HEALTH CARE EDUCATION/TRAINING PROGRAM

## 2022-03-08 PROCEDURE — 99999 PR PBB SHADOW E&M-EST. PATIENT-LVL II: CPT | Mod: PBBFAC,,, | Performed by: STUDENT IN AN ORGANIZED HEALTH CARE EDUCATION/TRAINING PROGRAM

## 2022-03-08 PROCEDURE — 1160F RVW MEDS BY RX/DR IN RCRD: CPT | Mod: CPTII,S$GLB,, | Performed by: STUDENT IN AN ORGANIZED HEALTH CARE EDUCATION/TRAINING PROGRAM

## 2022-03-08 RX ORDER — NETARSUDIL AND LATANOPROST OPHTHALMIC SOLUTION, 0.02%/0.005% .2; .05 MG/ML; MG/ML
1 SOLUTION/ DROPS OPHTHALMIC; TOPICAL NIGHTLY
Qty: 7 ML | Refills: 3 | Status: SHIPPED | OUTPATIENT
Start: 2022-03-08 | End: 2023-01-03

## 2022-03-08 RX ORDER — NETARSUDIL AND LATANOPROST OPHTHALMIC SOLUTION, 0.02%/0.005% .2; .05 MG/ML; MG/ML
SOLUTION/ DROPS OPHTHALMIC; TOPICAL
COMMUNITY
Start: 2021-12-28 | End: 2022-03-08 | Stop reason: SDUPTHER

## 2022-03-08 RX ORDER — DORZOLAMIDE HYDROCHLORIDE AND TIMOLOL MALEATE 20; 5 MG/ML; MG/ML
1 SOLUTION/ DROPS OPHTHALMIC 2 TIMES DAILY
Qty: 2 EACH | Refills: 3 | Status: SHIPPED | OUTPATIENT
Start: 2022-03-08 | End: 2022-05-12 | Stop reason: SDUPTHER

## 2022-03-08 NOTE — PROGRESS NOTES
Subjective:       Patient ID: Carina Rios is a 66 y.o. female.    Chief Complaint: Glaucoma  HPI     Pt was referred here today by Dr. Cool for a Glaucoma Evaluation.  She states she was first diagnosed with Glaucoma when she was in college.   She has been using eye drops since she was first diagnosed. She has had   multiple surgeries OU over the years for her Glaucoma. She has also had   Cataract surgery OU.    C/o redness and tearing.     Eye Meds:  Rocklatan QHS OU  Dorzolamide-Timolol BID OU    Last edited by Chelsey Seals MD on 3/8/2022 12:19 PM. (History)              Assessment & Plan   Primary open angle glaucoma (POAG) of left eye, severe stage  -     ROCKLATAN 0.02-0.005 % Drop; Place 1 drop into both eyes every evening.  Dispense: 7 mL; Refill: 3  -     dorzolamide-timolol 2-0.5% (COSOPT) 22.3-6.8 mg/mL ophthalmic solution; Place 1 drop into both eyes 2 (two) times daily.  Dispense: 2 each; Refill: 3    Primary open angle glaucoma (POAG) of right eye, severe stage  -     ROCKLATAN 0.02-0.005 % Drop; Place 1 drop into both eyes every evening.  Dispense: 7 mL; Refill: 3  -     dorzolamide-timolol 2-0.5% (COSOPT) 22.3-6.8 mg/mL ophthalmic solution; Place 1 drop into both eyes 2 (two) times daily.  Dispense: 2 each; Refill: 3    Pseudophakia of both eyes    Meibomian gland dysfunction (MGD) of both eyes    Juvenile glaucoma  -     ROCKLATAN 0.02-0.005 % Drop; Place 1 drop into both eyes every evening.  Dispense: 7 mL; Refill: 3  -     dorzolamide-timolol 2-0.5% (COSOPT) 22.3-6.8 mg/mL ophthalmic solution; Place 1 drop into both eyes 2 (two) times daily.  Dispense: 2 each; Refill: 3         Referral from Dr. Cool. Previously treated with Dr. Potter, Dr. Raymond.     POAG severe OU   JOAG - Dx'd at 20 y/o  -Fhx (+mtp family members), Steroids (-),Trauma(-)  -Drops: Rocklatan qHS OU // Dorzolamide-Timolol BID OU  -Drop intolerance/contraindication:  -Laser:  -Surgeries: Trab OU // IN BV OU // ST  AVG OS // CE IOL OU  -CCT: 527 // 482 thin  -Gonio: 3+ with sclerostomies and scattered PAS    Tm unclear    Do not have previous records --> get record release next visit  IOP may be OK today  Ok to CPM with gtts - Refill all medications - 90-day [employer] mail order  Discussed eyedrops and if more than one, recommend 5 minutes between all drops.         Pseudophakia  Lenses WC, monitor    Meibomian Gland Disease OU  Counseled and discussed oil glands are inspissated / obstructed causing symptomatic evaporative dry eye   Failed WC  Start OcuSoft lid wipes BID x 1 month, then daily thereafter    Red eye OU  Likely from Rocklatan  Can try Lumify OTC    PLAN  Continue  Rocklatan qHS OU // Dorzolamide-Timolol BID OU  Start OcuSoft lid wipes BID OU  Can try Lumify OTC    RTC 3 months with HVF24-2, OCT-RNFL, recheck APD    Chelsey Seals M.D., M.S.  Department of Ophthalmology   Division of Glaucoma Surgery  [facility]

## 2022-05-12 DIAGNOSIS — Q15.0 JUVENILE GLAUCOMA: ICD-10-CM

## 2022-05-12 DIAGNOSIS — H40.1113 PRIMARY OPEN ANGLE GLAUCOMA (POAG) OF RIGHT EYE, SEVERE STAGE: ICD-10-CM

## 2022-05-12 DIAGNOSIS — H40.1123 PRIMARY OPEN ANGLE GLAUCOMA (POAG) OF LEFT EYE, SEVERE STAGE: ICD-10-CM

## 2022-05-13 DIAGNOSIS — H40.1113 PRIMARY OPEN ANGLE GLAUCOMA (POAG) OF RIGHT EYE, SEVERE STAGE: ICD-10-CM

## 2022-05-13 DIAGNOSIS — Q15.0 JUVENILE GLAUCOMA: ICD-10-CM

## 2022-05-13 DIAGNOSIS — H40.1123 PRIMARY OPEN ANGLE GLAUCOMA (POAG) OF LEFT EYE, SEVERE STAGE: ICD-10-CM

## 2022-05-13 RX ORDER — DORZOLAMIDE HYDROCHLORIDE AND TIMOLOL MALEATE 20; 5 MG/ML; MG/ML
1 SOLUTION/ DROPS OPHTHALMIC 2 TIMES DAILY
Qty: 2 EACH | Refills: 2 | Status: SHIPPED | OUTPATIENT
Start: 2022-05-13 | End: 2023-01-03

## 2022-05-13 RX ORDER — DORZOLAMIDE HYDROCHLORIDE AND TIMOLOL MALEATE 20; 5 MG/ML; MG/ML
1 SOLUTION/ DROPS OPHTHALMIC 2 TIMES DAILY
Qty: 2 EACH | Refills: 2 | Status: SHIPPED | OUTPATIENT
Start: 2022-05-13 | End: 2022-05-13 | Stop reason: SDUPTHER

## 2022-06-16 NOTE — PROGRESS NOTES
Subjective:       Patient ID: Carina Rios is a 66 y.o. female.    Chief Complaint: Glaucoma (HVF and OCT review today )       HPI     Glaucoma     Comments: HVF and OCT review today               Comments     Eyes feel itchy and Red. Did use Lumify and it helps relieve redness for   4 hours. Not using every day. Feels like OS getting worse. Pt reports   macular edema OS in 2020 treated by Dr. Raymond. Pt reports she thinks   she got it after he glaucoma surgery. She says that she was treated with   steroid drops for this, but it made the IOP worse and the macular edema   did not improve. Decision was made to discontinue steroid drops. She says   she has not seen a retinal doctor for the macular edema.       DLS: 3/8/22    1. Severe POAG OU  2. PCIOL OU  3. MGD    MEDS:  Cosopt BID OU  Rocklatan QHS OU  Lumify PRN OU          Last edited by Chelsey Seals MD on 6/17/2022 11:55 AM. (History)            Assessment & Plan   Primary open angle glaucoma (POAG) of left eye, severe stage  -     Yates Visual Field - OU - Extended - Both Eyes  -     Posterior Segment OCT Optic Nerve- Both eyes    Primary open angle glaucoma (POAG) of right eye, severe stage  -     Yates Visual Field - OU - Extended - Both Eyes  -     Posterior Segment OCT Optic Nerve- Both eyes    Macular edema of left eye    Pseudophakia of both eyes    Meibomian gland dysfunction (MGD) of both eyes    Juvenile glaucoma       Referral from Dr. Cool. Previously treated with Dr. Potter, Dr. Raymond.     POAG severe OU   JOAG - Dx'd at 20 y/o  -Fhx (+mtp family members), Steroids (-),Trauma(-)  -Drops: Rocklatan qHS OU // Dorzolamide-Timolol BID OU ==> All medications - 90-day [employer] mail order  -Drop intolerance/contraindication:  -Laser:  -Surgeries: Trab OU // IN BV OU // ST AVG OS // CE IOL OU  -CCT: 527 // 482 thin  -Gonio: 3+ with sclerostomies and scattered PAS  Tm unclear    [facility] from [facility] -  NO HVF or OCT images in  records faxed    6/22 HVF Paracentral scotoma VFI 92% OD // severe depression VFI 0% OS  6/22 RNFL dec G/TI B T OD // dec G/TS/TI/NI/N B T/NS OS  Macula wnl OD // edema OS    HVF OD better than expected - has paracentral scotoma - want to repeat 6 months to see if artifact  Pt reports she thinks she can see in peripheral vision OS - Need to try HVF 24-2 stim V OS    IOP TOO HIGH OS TODAY  OK for 1 episode high, recheck and if still high will need additional IOP lowering  May need cyst over ahmed plate decompressed    Macular edema OS  Unclear etiology  Likely chronic - pt reports since ~2020  Steroid responder  Refer to retina for evaluation - possible FA to uncover if anti-VEGF would help  Unclear benefit of Tx given severity of glaucoma and chronicity of edema but do recommend evaluation in case any VA improvement       Allergic conjunctivitis OU  Mild but itchy  OTC allergy drop handout provided  Educated must use at least 1-2 x daily for 2 weeks for effectiveness      Pseudophakia  Lenses WC, monitor    Meibomian Gland Disease OU  Counseled and discussed oil glands are inspissated / obstructed causing symptomatic evaporative dry eye   Failed WC   OcuSoft lid wipes BID x 1 month, then daily thereafter    Red eye OU  Likely from Rocklatan  Lumify OTC working but patient only using once in a while  OK to use daily up to TID    PLAN  Continue  Rocklatan qHS OU // Dorzolamide-Timolol BID OU  Start OcuSoft lid wipes BID OU  Can try Lumify OTC    RTC 3 months with HVF24-2, OCT-RNFL, recheck APD    PLAN  Continue  Rocklatan qHS OU // Dorzolamide-Timolol BID OU  Ok to cont Lumify OTC for redness  Allergy drop handout provided for eye itchiness  Refer to retina     Need to try HVF 24-2 stim V OS    RTC 3 months IOP check     Chelsey Seals M.D., M.S.  Department of Ophthalmology   Division of Glaucoma Surgery  [facility]

## 2022-06-17 ENCOUNTER — OFFICE VISIT (OUTPATIENT)
Dept: OPHTHALMOLOGY | Facility: CLINIC | Age: 67
End: 2022-06-17
Payer: MEDICARE

## 2022-06-17 ENCOUNTER — CLINICAL SUPPORT (OUTPATIENT)
Dept: OPHTHALMOLOGY | Facility: CLINIC | Age: 67
End: 2022-06-17
Payer: MEDICARE

## 2022-06-17 DIAGNOSIS — H40.1123 PRIMARY OPEN ANGLE GLAUCOMA (POAG) OF LEFT EYE, SEVERE STAGE: Primary | ICD-10-CM

## 2022-06-17 DIAGNOSIS — H35.81 MACULAR EDEMA OF LEFT EYE: ICD-10-CM

## 2022-06-17 DIAGNOSIS — Q15.0 JUVENILE GLAUCOMA: ICD-10-CM

## 2022-06-17 DIAGNOSIS — H02.886 MEIBOMIAN GLAND DYSFUNCTION (MGD) OF BOTH EYES: ICD-10-CM

## 2022-06-17 DIAGNOSIS — H40.1113 PRIMARY OPEN ANGLE GLAUCOMA (POAG) OF RIGHT EYE, SEVERE STAGE: ICD-10-CM

## 2022-06-17 DIAGNOSIS — H02.883 MEIBOMIAN GLAND DYSFUNCTION (MGD) OF BOTH EYES: ICD-10-CM

## 2022-06-17 DIAGNOSIS — Z96.1 PSEUDOPHAKIA OF BOTH EYES: ICD-10-CM

## 2022-06-17 PROCEDURE — 1160F RVW MEDS BY RX/DR IN RCRD: CPT | Mod: CPTII,S$GLB,, | Performed by: STUDENT IN AN ORGANIZED HEALTH CARE EDUCATION/TRAINING PROGRAM

## 2022-06-17 PROCEDURE — 99214 OFFICE O/P EST MOD 30 MIN: CPT | Mod: S$GLB,,, | Performed by: STUDENT IN AN ORGANIZED HEALTH CARE EDUCATION/TRAINING PROGRAM

## 2022-06-17 PROCEDURE — 92083 HUMPHREY VISUAL FIELD - OU - BOTH EYES: ICD-10-PCS | Mod: S$GLB,,, | Performed by: STUDENT IN AN ORGANIZED HEALTH CARE EDUCATION/TRAINING PROGRAM

## 2022-06-17 PROCEDURE — 99999 PR PBB SHADOW E&M-EST. PATIENT-LVL II: ICD-10-PCS | Mod: PBBFAC,,, | Performed by: STUDENT IN AN ORGANIZED HEALTH CARE EDUCATION/TRAINING PROGRAM

## 2022-06-17 PROCEDURE — 99214 PR OFFICE/OUTPT VISIT, EST, LEVL IV, 30-39 MIN: ICD-10-PCS | Mod: S$GLB,,, | Performed by: STUDENT IN AN ORGANIZED HEALTH CARE EDUCATION/TRAINING PROGRAM

## 2022-06-17 PROCEDURE — 92133 CPTRZD OPH DX IMG PST SGM ON: CPT | Mod: S$GLB,,, | Performed by: STUDENT IN AN ORGANIZED HEALTH CARE EDUCATION/TRAINING PROGRAM

## 2022-06-17 PROCEDURE — 99999 PR PBB SHADOW E&M-EST. PATIENT-LVL II: CPT | Mod: PBBFAC,,, | Performed by: STUDENT IN AN ORGANIZED HEALTH CARE EDUCATION/TRAINING PROGRAM

## 2022-06-17 PROCEDURE — 92083 EXTENDED VISUAL FIELD XM: CPT | Mod: S$GLB,,, | Performed by: STUDENT IN AN ORGANIZED HEALTH CARE EDUCATION/TRAINING PROGRAM

## 2022-06-17 PROCEDURE — 1160F PR REVIEW ALL MEDS BY PRESCRIBER/CLIN PHARMACIST DOCUMENTED: ICD-10-PCS | Mod: CPTII,S$GLB,, | Performed by: STUDENT IN AN ORGANIZED HEALTH CARE EDUCATION/TRAINING PROGRAM

## 2022-06-17 PROCEDURE — 1159F PR MEDICATION LIST DOCUMENTED IN MEDICAL RECORD: ICD-10-PCS | Mod: CPTII,S$GLB,, | Performed by: STUDENT IN AN ORGANIZED HEALTH CARE EDUCATION/TRAINING PROGRAM

## 2022-06-17 PROCEDURE — 92133 POSTERIOR SEGMENT OCT OPTIC NERVE(OCULAR COHERENCE TOMOGRAPHY) - OU - BOTH EYES: ICD-10-PCS | Mod: S$GLB,,, | Performed by: STUDENT IN AN ORGANIZED HEALTH CARE EDUCATION/TRAINING PROGRAM

## 2022-06-17 PROCEDURE — 1159F MED LIST DOCD IN RCRD: CPT | Mod: CPTII,S$GLB,, | Performed by: STUDENT IN AN ORGANIZED HEALTH CARE EDUCATION/TRAINING PROGRAM

## 2022-06-17 RX ORDER — PANTOPRAZOLE SODIUM 20 MG/1
20 TABLET, DELAYED RELEASE ORAL DAILY
COMMUNITY
Start: 2022-03-09

## 2022-06-17 RX ORDER — ATORVASTATIN CALCIUM 10 MG/1
10 TABLET, FILM COATED ORAL DAILY
COMMUNITY
Start: 2022-04-04

## 2022-06-20 NOTE — PROGRESS NOTES
Hvf done ou. Rel/fix/ poor od good os coop. Good ou./chart checked for latex allergy./[place]/od [place]/os-[facility]

## 2022-06-24 ENCOUNTER — OFFICE VISIT (OUTPATIENT)
Dept: OPHTHALMOLOGY | Facility: CLINIC | Age: 67
End: 2022-06-24
Payer: MEDICARE

## 2022-06-24 DIAGNOSIS — Z96.1 PSEUDOPHAKIA OF BOTH EYES: ICD-10-CM

## 2022-06-24 DIAGNOSIS — H40.1133 PRIMARY OPEN ANGLE GLAUCOMA (POAG) OF BOTH EYES, SEVERE STAGE: ICD-10-CM

## 2022-06-24 DIAGNOSIS — H35.81 MACULAR EDEMA OF LEFT EYE: Primary | ICD-10-CM

## 2022-06-24 PROCEDURE — 92134 CPTRZ OPH DX IMG PST SGM RTA: CPT | Mod: S$GLB,,, | Performed by: OPHTHALMOLOGY

## 2022-06-24 PROCEDURE — 99999 PR PBB SHADOW E&M-EST. PATIENT-LVL II: ICD-10-PCS | Mod: PBBFAC,,, | Performed by: OPHTHALMOLOGY

## 2022-06-24 PROCEDURE — 99214 OFFICE O/P EST MOD 30 MIN: CPT | Mod: S$GLB,,, | Performed by: OPHTHALMOLOGY

## 2022-06-24 PROCEDURE — 1159F MED LIST DOCD IN RCRD: CPT | Mod: CPTII,S$GLB,, | Performed by: OPHTHALMOLOGY

## 2022-06-24 PROCEDURE — 1160F PR REVIEW ALL MEDS BY PRESCRIBER/CLIN PHARMACIST DOCUMENTED: ICD-10-PCS | Mod: CPTII,S$GLB,, | Performed by: OPHTHALMOLOGY

## 2022-06-24 PROCEDURE — 2023F DILAT RTA XM W/O RTNOPTHY: CPT | Mod: CPTII,S$GLB,, | Performed by: OPHTHALMOLOGY

## 2022-06-24 PROCEDURE — 1159F PR MEDICATION LIST DOCUMENTED IN MEDICAL RECORD: ICD-10-PCS | Mod: CPTII,S$GLB,, | Performed by: OPHTHALMOLOGY

## 2022-06-24 PROCEDURE — 99999 PR PBB SHADOW E&M-EST. PATIENT-LVL II: CPT | Mod: PBBFAC,,, | Performed by: OPHTHALMOLOGY

## 2022-06-24 PROCEDURE — 2023F PR DILATED RETINAL EXAM W/O EVID OF RETINOPATHY: ICD-10-PCS | Mod: CPTII,S$GLB,, | Performed by: OPHTHALMOLOGY

## 2022-06-24 PROCEDURE — 99214 PR OFFICE/OUTPT VISIT, EST, LEVL IV, 30-39 MIN: ICD-10-PCS | Mod: S$GLB,,, | Performed by: OPHTHALMOLOGY

## 2022-06-24 PROCEDURE — 1160F RVW MEDS BY RX/DR IN RCRD: CPT | Mod: CPTII,S$GLB,, | Performed by: OPHTHALMOLOGY

## 2022-06-24 PROCEDURE — 92134 OCT, RETINA - OU - BOTH EYES: ICD-10-PCS | Mod: S$GLB,,, | Performed by: OPHTHALMOLOGY

## 2022-06-24 NOTE — PROGRESS NOTES
HPI     Mac Edema per Dr. Seals     No recent changes to vision.  Pt reports macular edema OS in 2020 treated   by Dr. Raymond (Glaucoma). Pt reports she thinks she got it after he   glaucoma surgery. She says that she was treated with steroid drops for   this, but it made the IOP worse and the macular edema did not improve.   Decision was made to discontinue steroid drops. Past h/o of floaters OU   but not recently.    No flashes  No floaters  No pain  Gtts: Cosopt BID OU   Rocklatan QHS OU   Lumify PRN OU            1. Severe POAG OU  2. PCIOL OU  3. MGD    MEDS:  Cosopt BID OU  Rocklatan QHS OU  Lumify PRN OU    Last edited by Larissa Magallanes on 6/24/2022  9:01 AM. (History)         A/P    ICD-10-CM ICD-9-CM   1. Macular edema of left eye  H35.81 362.83   2. Primary open angle glaucoma (POAG) of both eyes, severe stage  H40.1133 365.11     365.73   3. Pseudophakia of both eyes  Z96.1 V43.1       1. Macular edema of left eye  Referral from Dr. Seals for CME eval  Per pt, was treated back in 2020 for glaucoma surgery and got CME afterward, was on steroid drops but had steroid response     Today VA HM (likely moreso due to severe end stage glaucoma) with foveal centered IRF, has been on steroid gtt in past with response and has been on Ketorolac in past but didn't tolerate    Plan: Likely CME 2/2 to post-operative inflammation related to scant iris chaffing due to tube placement, discussed options of restarting ketorolac but as patient didn't tolerate it in the past and severity of vision loss due to severe POAG, we can observe CME for now and can see the patient if there is worsening of vision    2. Primary open angle glaucoma (POAG) of both eyes, severe stage  F/b Dr. Seals  IOP 14/19 on Cosopt BID OU, Rocklatan qHS OU, Lumify prn OU  Plan: Continue Present Management     3. Pseudophakia of both eyes  Good lens position OU    RTC Mendez prn  RTC Bozena as scheduled    I saw and examined the patient and reviewed in  detail the findings documented. The final examination findings, image interpretations, and plan as documented in the record represent my personal judgment and conclusions.    Gerry Simms MD  Vitreoretinal Surgery   [facility]

## 2022-09-19 NOTE — PROGRESS NOTES
Subjective:       Patient ID: Carina Rios is a 66 y.o. female.    Chief Complaint: Glaucoma    HPI    Pt is here today for 3 month IOP Check.  She states her eyes have been very irritated. They are always watering and   sometimes burn and itch. Her vision has not been good either. Feels like   VA OS has been somewhat declining. Using Ocusoft qHS, and WC qAM. Using AT   1 time per day.     Gtts: Cosopt BID OU   Rocklatan QHS OU       Last edited by Chelsey Seals MD on 9/20/2022 10:49 AM.            Assessment & Plan   Primary open angle glaucoma (POAG) of both eyes, severe stage  -     brimonidine 0.2% (ALPHAGAN) 0.2 % Drop; Place 1 drop into the left eye 3 (three) times daily.  Dispense: 15 mL; Refill: 3    Meibomian gland dysfunction (MGD) of both eyes  -     erythromycin (ROMYCIN) ophthalmic ointment; Place into both eyes every evening. Apply to inside eye every night before bed  Dispense: 7 g; Refill: 3    Keratoconjunctivitis sicca    Pseudophakia of both eyes    Macular edema of left eye     Referral from Dr. Cool. Previously treated with Dr. Potter, Dr. Raymond.     POAG severe OU   JOAG - Dx'd at 22 y/o   -Fhx (+mtp family members), Steroids (-),Trauma(-)  -Drops: Rocklatan qHS OU // Dorzolamide-Timolol BID OU ==> All medications - 90-day [employer] mail order  -Drop intolerance/contraindication:  -Laser:  -Surgeries: Trab OU // IN BV OU // ST AVG OS // CE IOL OU  -CCT: 527 // 482 thin  -Gonio: 3+ with sclerostomies and scattered PAS  Tm 40-50?    6/22 HVF Paracentral scotoma VFI 92% OD // severe depression VFI 0% OS  6/22 RNFL dec G/TI B T OD // dec G/TS/TI/NI/N B T/NS OS  Macula wnl OD // edema OS    IOP improved OS but pt feels like losing vision --> discussed option of micropulse versus starting Brimonidine.   Start Brimonidine TID OS --> if increased redness/irritation --> stop    Continue other drops --> she arrived to me on these and has been on for years      Meibomian Gland Disease  OU  Counseled and discussed oil glands are inspissated / obstructed causing symptomatic evaporative dry eye   Continue WC qAM,  OcuSoft lid wipes qPM  Start erythromycin ophthalmic airam qHS OU  Start Refresh CAMI QID OU  If no improvement in symptoms in 2 months then option of doxycycline 100 mg PO BID x 1 month then daily VERSUS Restasis/Xiidra     Macular edema OS  Chronic CME post AVG OS  Saw Dr. Simms - recommended to monitor       Allergic conjunctivitis OU  Continue to use Pataday daily       Pseudophakia  Lenses WC, monitor      Red eye OU  CHRONIC --> today's appearance similar to first visit with me 3/2022  Can use Lumify OTC   Likely from medicamentosa   Tx above for MGD      PLAN  Continue  Rocklatan qHS OU // Dorzolamide-Timolol BID OU  Start brimonidine TID OS    Continue WC qAM,  OcuSoft lid wipes qPM  Start erythromycin ophthalmic airam qHS OU  Start Refresh CAMI QID OU    Discussed maternity leave. Due to severity of glaucoma, not advisable to stretch to 5 months --> see Dr. Hahn in 2 months for dry eye check, IOP, and Mrx    See me Jan/Feb for glaucoma Dest        Chelsey Seals M.D., M.S.  Department of Ophthalmology   Division of Glaucoma Surgery  [facility]

## 2022-09-20 ENCOUNTER — OFFICE VISIT (OUTPATIENT)
Dept: OPHTHALMOLOGY | Facility: CLINIC | Age: 67
End: 2022-09-20
Payer: MEDICARE

## 2022-09-20 DIAGNOSIS — M35.01 KERATOCONJUNCTIVITIS SICCA: ICD-10-CM

## 2022-09-20 DIAGNOSIS — H02.886 MEIBOMIAN GLAND DYSFUNCTION (MGD) OF BOTH EYES: ICD-10-CM

## 2022-09-20 DIAGNOSIS — H35.81 MACULAR EDEMA OF LEFT EYE: ICD-10-CM

## 2022-09-20 DIAGNOSIS — H40.1133 PRIMARY OPEN ANGLE GLAUCOMA (POAG) OF BOTH EYES, SEVERE STAGE: Primary | ICD-10-CM

## 2022-09-20 DIAGNOSIS — H02.883 MEIBOMIAN GLAND DYSFUNCTION (MGD) OF BOTH EYES: ICD-10-CM

## 2022-09-20 DIAGNOSIS — Z96.1 PSEUDOPHAKIA OF BOTH EYES: ICD-10-CM

## 2022-09-20 PROCEDURE — 1160F PR REVIEW ALL MEDS BY PRESCRIBER/CLIN PHARMACIST DOCUMENTED: ICD-10-PCS | Mod: CPTII,S$GLB,, | Performed by: STUDENT IN AN ORGANIZED HEALTH CARE EDUCATION/TRAINING PROGRAM

## 2022-09-20 PROCEDURE — 1159F MED LIST DOCD IN RCRD: CPT | Mod: CPTII,S$GLB,, | Performed by: STUDENT IN AN ORGANIZED HEALTH CARE EDUCATION/TRAINING PROGRAM

## 2022-09-20 PROCEDURE — 99214 OFFICE O/P EST MOD 30 MIN: CPT | Mod: S$GLB,,, | Performed by: STUDENT IN AN ORGANIZED HEALTH CARE EDUCATION/TRAINING PROGRAM

## 2022-09-20 PROCEDURE — 1160F RVW MEDS BY RX/DR IN RCRD: CPT | Mod: CPTII,S$GLB,, | Performed by: STUDENT IN AN ORGANIZED HEALTH CARE EDUCATION/TRAINING PROGRAM

## 2022-09-20 PROCEDURE — 99999 PR PBB SHADOW E&M-EST. PATIENT-LVL II: CPT | Mod: PBBFAC,,, | Performed by: STUDENT IN AN ORGANIZED HEALTH CARE EDUCATION/TRAINING PROGRAM

## 2022-09-20 PROCEDURE — 1159F PR MEDICATION LIST DOCUMENTED IN MEDICAL RECORD: ICD-10-PCS | Mod: CPTII,S$GLB,, | Performed by: STUDENT IN AN ORGANIZED HEALTH CARE EDUCATION/TRAINING PROGRAM

## 2022-09-20 PROCEDURE — 99214 PR OFFICE/OUTPT VISIT, EST, LEVL IV, 30-39 MIN: ICD-10-PCS | Mod: S$GLB,,, | Performed by: STUDENT IN AN ORGANIZED HEALTH CARE EDUCATION/TRAINING PROGRAM

## 2022-09-20 PROCEDURE — 99999 PR PBB SHADOW E&M-EST. PATIENT-LVL II: ICD-10-PCS | Mod: PBBFAC,,, | Performed by: STUDENT IN AN ORGANIZED HEALTH CARE EDUCATION/TRAINING PROGRAM

## 2022-09-20 RX ORDER — ERYTHROMYCIN 5 MG/G
OINTMENT OPHTHALMIC NIGHTLY
Qty: 7 G | Refills: 3 | Status: SHIPPED | OUTPATIENT
Start: 2022-09-20 | End: 2023-06-20

## 2022-09-20 RX ORDER — BRIMONIDINE TARTRATE 2 MG/ML
1 SOLUTION/ DROPS OPHTHALMIC 3 TIMES DAILY
Qty: 15 ML | Refills: 3 | Status: SHIPPED | OUTPATIENT
Start: 2022-09-20 | End: 2023-02-14 | Stop reason: ALTCHOICE

## 2022-11-23 ENCOUNTER — OFFICE VISIT (OUTPATIENT)
Dept: OPTOMETRY | Facility: CLINIC | Age: 67
End: 2022-11-23
Payer: MEDICARE

## 2022-11-23 DIAGNOSIS — H52.7 REFRACTIVE ERROR: ICD-10-CM

## 2022-11-23 DIAGNOSIS — H40.1133 PRIMARY OPEN ANGLE GLAUCOMA (POAG) OF BOTH EYES, SEVERE STAGE: ICD-10-CM

## 2022-11-23 DIAGNOSIS — H16.223 KERATOCONJUNCTIVITIS SICCA NOT SPECIFIED AS SJOGREN'S, BILATERAL: Primary | ICD-10-CM

## 2022-11-23 PROCEDURE — 4010F ACE/ARB THERAPY RXD/TAKEN: CPT | Mod: CPTII,S$GLB,, | Performed by: OPTOMETRIST

## 2022-11-23 PROCEDURE — 99499 UNLISTED E&M SERVICE: CPT | Mod: HCWC,S$GLB,, | Performed by: OPTOMETRIST

## 2022-11-23 PROCEDURE — 92002 INTRM OPH EXAM NEW PATIENT: CPT | Mod: S$GLB,,, | Performed by: OPTOMETRIST

## 2022-11-23 PROCEDURE — 1101F PT FALLS ASSESS-DOCD LE1/YR: CPT | Mod: CPTII,S$GLB,, | Performed by: OPTOMETRIST

## 2022-11-23 PROCEDURE — 1159F MED LIST DOCD IN RCRD: CPT | Mod: CPTII,S$GLB,, | Performed by: OPTOMETRIST

## 2022-11-23 PROCEDURE — 3288F FALL RISK ASSESSMENT DOCD: CPT | Mod: CPTII,S$GLB,, | Performed by: OPTOMETRIST

## 2022-11-23 PROCEDURE — 3288F PR FALLS RISK ASSESSMENT DOCUMENTED: ICD-10-PCS | Mod: CPTII,S$GLB,, | Performed by: OPTOMETRIST

## 2022-11-23 PROCEDURE — 99999 PR PBB SHADOW E&M-EST. PATIENT-LVL II: CPT | Mod: PBBFAC,,, | Performed by: OPTOMETRIST

## 2022-11-23 PROCEDURE — 92002 PR EYE EXAM, NEW PATIENT,INTERMED: ICD-10-PCS | Mod: S$GLB,,, | Performed by: OPTOMETRIST

## 2022-11-23 PROCEDURE — 99499 RISK ADDL DX/OHS AUDIT: ICD-10-PCS | Mod: HCWC,S$GLB,, | Performed by: OPTOMETRIST

## 2022-11-23 PROCEDURE — 99999 PR PBB SHADOW E&M-EST. PATIENT-LVL II: ICD-10-PCS | Mod: PBBFAC,,, | Performed by: OPTOMETRIST

## 2022-11-23 PROCEDURE — 92015 PR REFRACTION: ICD-10-PCS | Mod: S$GLB,,, | Performed by: OPTOMETRIST

## 2022-11-23 PROCEDURE — 1101F PR PT FALLS ASSESS DOC 0-1 FALLS W/OUT INJ PAST YR: ICD-10-PCS | Mod: CPTII,S$GLB,, | Performed by: OPTOMETRIST

## 2022-11-23 PROCEDURE — 92015 DETERMINE REFRACTIVE STATE: CPT | Mod: S$GLB,,, | Performed by: OPTOMETRIST

## 2022-11-23 PROCEDURE — 4010F PR ACE/ARB THEARPY RXD/TAKEN: ICD-10-PCS | Mod: CPTII,S$GLB,, | Performed by: OPTOMETRIST

## 2022-11-23 PROCEDURE — 1159F PR MEDICATION LIST DOCUMENTED IN MEDICAL RECORD: ICD-10-PCS | Mod: CPTII,S$GLB,, | Performed by: OPTOMETRIST

## 2022-11-23 NOTE — PROGRESS NOTES
HPI     Concerns About Ocular Health     Additional comments: DLS: 9/20/2022--           Comments    Presents today for dry eye check, IOP cehck, and Mrx -- per Dr. Seals. Pt   reports a decrease in vision since last visit with Dr. Seals. Pt also   reports constant tearing, redness, and itching OU. Pt reports she's using   an older pair of glasses that she found that helps her see a little   better. Pt denies eye pain. Patient d/c brimonidine. She reports it made   her eyes more red.     Eye Meds:  Cosopt BID OU   Rocklatan QHS OU   Refresh few times a day           Last edited by Marily Hahn, OD on 11/23/2022  2:27 PM.            Assessment /Plan     For exam results, see Encounter Report.    Keratoconjunctivitis sicca not specified as Sjogren's, bilateral  -     lifitegrast (XIIDRA) 5 % Dpet; Place 1 drop into both eyes 2 (two) times a day.  Dispense: 60 each; Refill: 11    Refractive error    Primary open angle glaucoma (POAG) of both eyes, severe stage      Educated pt on findings. Minimal improvement with dry eyes --- patient reports using refresh a few times a day. She d/c use of erythromycin airam. Rx xiidra 1 gtt OU BID. Discussed delayed effectiveness. Recommend to continue ATs TID-QID for added lubrication and comfort. Patient reports redness may be related to glaucoma gtts. If symptoms worsen or dont improve, RTC. Monitor.      2. Updated SRx. Moderate change from habitual. Improvement in BCVA OD. Educated on possible adaptation symptoms due to change in spec Rx. Recommend polycarb lenses due to monocular status. Monitor yearly.      3. Educated pt on findings. Patient of Dr. Seals. IOP stable since last visit. Patient on Cosopt BID OU + Rocklatan QHS OU. She d/c brimonidine because it caused eye redness. Will have pt f/u with Dr. Seals in ~3 months once she returns from maternity leave. Monitor.       RTC with Dr. Seals as directed, me prn.

## 2022-11-30 ENCOUNTER — TELEPHONE (OUTPATIENT)
Dept: OPTOMETRY | Facility: CLINIC | Age: 67
End: 2022-11-30
Payer: MEDICARE

## 2022-11-30 DIAGNOSIS — H16.223 KERATOCONJUNCTIVITIS SICCA NOT SPECIFIED AS SJOGREN'S, BILATERAL: ICD-10-CM

## 2022-11-30 RX ORDER — CYCLOSPORINE 0.5 MG/ML
1 EMULSION OPHTHALMIC 2 TIMES DAILY
Qty: 60 EACH | Refills: 11 | Status: SHIPPED | OUTPATIENT
Start: 2022-11-30 | End: 2023-11-30

## 2022-11-30 NOTE — TELEPHONE ENCOUNTER
Changed from Xiidra to Restasis due to better insurance coverage. Patient to use Restasis 1 gtt OU BID. Educated on delayed effectiveness.       Marily Hahn, SHAWNA  11/30/2022

## 2023-02-10 NOTE — PROGRESS NOTES
Subjective:       Patient ID: Carina Rios is a 67 y.o. female.    Chief Complaint: Glaucoma     HPI    Pt is here today for IOP Check.  She states her eyes have been very irritated. She saw Dr. Hahn and was   prescribed Xiidra but her insurance did not cover it. Feels like since   starting restasis, think a little bit less red. She is concerned that   maybe the right eye vision is declining a little bit.     Using Refresh    Eye Meds:   Cosopt BID OU   Rocklatan QHS OU   Refresh few times a day    Last edited by Chelsey Seals MD on 2/14/2023 10:19 AM.            Assessment & Plan   Primary open angle glaucoma (POAG) of both eyes, severe stage  -     Cancel: Posterior Segment OCT Optic Nerve- Both eyes  -     Cancel: Yates Visual Field - OU - Extended - Both Eyes  -     dorzolamide-timolol, PF, (COSOPT PF) 2-0.5 % Dpet ophthalmic solution; Place 1 drop into both eyes 2 (two) times daily.  Dispense: 60 each; Refill: 12  -     Posterior Segment OCT Optic Nerve- Both eyes; Future  -     Yates Visual Field - OU - Extended - Both Eyes; Future    Meibomian gland dysfunction (MGD) of both eyes    Keratoconjunctivitis sicca    Pseudophakia of both eyes    Juvenile glaucoma    Macular edema of left eye       Referral from Dr. Cool. Previously treated with Dr. Potter, Dr. Raymond.     POAG severe OU   JOAG - Dx'd at 20 y/o   -Fhx (+mtp family members), Steroids (-),Trauma(-)  -Drops: Rocklatan qHS OU // Dorzolamide-Timolol BID OU // Restasis BID OU ==> All medications - 90-day [employer] mail order  -Drop intolerance/contraindication: Brimonidine redness  -Laser:  -Surgeries: Trab OU // IN BV OU // ST AVG OS // CE IOL OU  -CCT: 527 // 482 thin  -Gonio: 3+ with sclerostomies and scattered PAS  Tm 40-50?    6/22 HVF Paracentral scotoma VFI 92% OD // severe depression VFI 0% OS  6/22 RNFL dec G/TI B T OD // dec G/TS/TI/NI/N B T/NS OS  Macula wnl OD // edema OS    Continue  [facility] qHS OU   Change  dorzolamide-timolol to preservative free - will try to see if covered      Red eye OU  Multifactorial. Medicamentosa. MGD OU  CHRONIC -->  slight improvement with restasis  Tried Lumify - not using, did not work  Restasis BID OU   Stop full-preservative artifical tears -- Start preservative free artifical tears 2-4x/day  Change dorzol-susana to preservative free formulation      Macular edema OS  Chronic CME post AVG OS  Saw Dr. Simms - recommended to monitor       Allergic conjunctivitis OU  Not using Pataday any more       Pseudophakia  Lenses WC, monitor          PLAN  Continue  Rocklatan qHS OU   Change dorzolamide-timolol to preservative free - will try     Change to preservative free artifical tears in the vials do regularly    Restasis started 11/22 - continue    Bring all drops to next appt      RTC 3 months main campus for HVF 24-2 and OCT    Chelsey Seals M.D., M.S.  Department of Ophthalmology   Division of Glaucoma Surgery  [facility]

## 2023-02-14 ENCOUNTER — OFFICE VISIT (OUTPATIENT)
Dept: OPHTHALMOLOGY | Facility: CLINIC | Age: 68
End: 2023-02-14
Payer: MEDICARE

## 2023-02-14 DIAGNOSIS — Q15.0 JUVENILE GLAUCOMA: ICD-10-CM

## 2023-02-14 DIAGNOSIS — H40.1133 PRIMARY OPEN ANGLE GLAUCOMA (POAG) OF BOTH EYES, SEVERE STAGE: Primary | ICD-10-CM

## 2023-02-14 DIAGNOSIS — H35.81 MACULAR EDEMA OF LEFT EYE: ICD-10-CM

## 2023-02-14 DIAGNOSIS — Z96.1 PSEUDOPHAKIA OF BOTH EYES: ICD-10-CM

## 2023-02-14 DIAGNOSIS — H02.886 MEIBOMIAN GLAND DYSFUNCTION (MGD) OF BOTH EYES: ICD-10-CM

## 2023-02-14 DIAGNOSIS — M35.01 KERATOCONJUNCTIVITIS SICCA: ICD-10-CM

## 2023-02-14 DIAGNOSIS — H02.883 MEIBOMIAN GLAND DYSFUNCTION (MGD) OF BOTH EYES: ICD-10-CM

## 2023-02-14 PROCEDURE — 99999 PR PBB SHADOW E&M-EST. PATIENT-LVL II: CPT | Mod: PBBFAC,HCWC,, | Performed by: STUDENT IN AN ORGANIZED HEALTH CARE EDUCATION/TRAINING PROGRAM

## 2023-02-14 PROCEDURE — 1160F RVW MEDS BY RX/DR IN RCRD: CPT | Mod: HCWC,CPTII,S$GLB, | Performed by: STUDENT IN AN ORGANIZED HEALTH CARE EDUCATION/TRAINING PROGRAM

## 2023-02-14 PROCEDURE — 1159F PR MEDICATION LIST DOCUMENTED IN MEDICAL RECORD: ICD-10-PCS | Mod: HCWC,CPTII,S$GLB, | Performed by: STUDENT IN AN ORGANIZED HEALTH CARE EDUCATION/TRAINING PROGRAM

## 2023-02-14 PROCEDURE — 99999 PR PBB SHADOW E&M-EST. PATIENT-LVL II: ICD-10-PCS | Mod: PBBFAC,HCWC,, | Performed by: STUDENT IN AN ORGANIZED HEALTH CARE EDUCATION/TRAINING PROGRAM

## 2023-02-14 PROCEDURE — 99214 PR OFFICE/OUTPT VISIT, EST, LEVL IV, 30-39 MIN: ICD-10-PCS | Mod: HCWC,S$GLB,, | Performed by: STUDENT IN AN ORGANIZED HEALTH CARE EDUCATION/TRAINING PROGRAM

## 2023-02-14 PROCEDURE — 1160F PR REVIEW ALL MEDS BY PRESCRIBER/CLIN PHARMACIST DOCUMENTED: ICD-10-PCS | Mod: HCWC,CPTII,S$GLB, | Performed by: STUDENT IN AN ORGANIZED HEALTH CARE EDUCATION/TRAINING PROGRAM

## 2023-02-14 PROCEDURE — 1159F MED LIST DOCD IN RCRD: CPT | Mod: HCWC,CPTII,S$GLB, | Performed by: STUDENT IN AN ORGANIZED HEALTH CARE EDUCATION/TRAINING PROGRAM

## 2023-02-14 PROCEDURE — 1101F PT FALLS ASSESS-DOCD LE1/YR: CPT | Mod: HCWC,CPTII,S$GLB, | Performed by: STUDENT IN AN ORGANIZED HEALTH CARE EDUCATION/TRAINING PROGRAM

## 2023-02-14 PROCEDURE — 1126F PR PAIN SEVERITY QUANTIFIED, NO PAIN PRESENT: ICD-10-PCS | Mod: HCWC,CPTII,S$GLB, | Performed by: STUDENT IN AN ORGANIZED HEALTH CARE EDUCATION/TRAINING PROGRAM

## 2023-02-14 PROCEDURE — 99214 OFFICE O/P EST MOD 30 MIN: CPT | Mod: HCWC,S$GLB,, | Performed by: STUDENT IN AN ORGANIZED HEALTH CARE EDUCATION/TRAINING PROGRAM

## 2023-02-14 PROCEDURE — 1101F PR PT FALLS ASSESS DOC 0-1 FALLS W/OUT INJ PAST YR: ICD-10-PCS | Mod: HCWC,CPTII,S$GLB, | Performed by: STUDENT IN AN ORGANIZED HEALTH CARE EDUCATION/TRAINING PROGRAM

## 2023-02-14 PROCEDURE — 3288F FALL RISK ASSESSMENT DOCD: CPT | Mod: HCWC,CPTII,S$GLB, | Performed by: STUDENT IN AN ORGANIZED HEALTH CARE EDUCATION/TRAINING PROGRAM

## 2023-02-14 PROCEDURE — 1126F AMNT PAIN NOTED NONE PRSNT: CPT | Mod: HCWC,CPTII,S$GLB, | Performed by: STUDENT IN AN ORGANIZED HEALTH CARE EDUCATION/TRAINING PROGRAM

## 2023-02-14 PROCEDURE — 3288F PR FALLS RISK ASSESSMENT DOCUMENTED: ICD-10-PCS | Mod: HCWC,CPTII,S$GLB, | Performed by: STUDENT IN AN ORGANIZED HEALTH CARE EDUCATION/TRAINING PROGRAM

## 2023-02-14 RX ORDER — DORZOLAMIDE HYDROCHLORIDE AND TIMOLOL MALEATE PRESERVATIVE FREE 20; 5 MG/ML; MG/ML
1 SOLUTION/ DROPS OPHTHALMIC 2 TIMES DAILY
Qty: 60 EACH | Refills: 12 | Status: SHIPPED | OUTPATIENT
Start: 2023-02-14 | End: 2024-02-14

## 2023-02-22 ENCOUNTER — TELEPHONE (OUTPATIENT)
Dept: OPHTHALMOLOGY | Facility: CLINIC | Age: 68
End: 2023-02-22
Payer: MEDICARE

## 2023-02-22 NOTE — TELEPHONE ENCOUNTER
----- Message from Ashlie Sherman MA sent at 2/22/2023  2:17 PM CST -----    ----- Message -----  From: Hemanth Reyna  Sent: 2/22/2023  12:27 PM CST  To: [facility] Staff    Consult/Advisory    Name Of Caller:Carina      Contact Preference?: [phone]      Nature of Call? Pt called regarding rx dorzolamide-timolol, the insurance needs a authorization because it is a non preservative

## 2023-02-24 NOTE — TELEPHONE ENCOUNTER
Approvedon February 22  PA Case: [id number], Status: Approved, Coverage Starts on: 1/1/2023 12:00:00 AM, Coverage Ends on: 12/31/2023 12:00:00 AM. Questions? Contact [phone].

## 2023-05-16 NOTE — PROGRESS NOTES
"Subjective:       Patient ID: Carina Rios is a 67 y.o. female.    Chief Complaint: Glaucoma (3 mon iop chk/oct/hvf rev)     HPI     Glaucoma            Comments: 3 mon iop chk/oct/hvf rev          Comments    Pt feels like she has a "pressure" feeling over OS at times. Feels like   vision is foggy.     Eye Meds:   Cosopt-PF BID OU   Rocklatan QHS OU   Restasis PRN - when eyes feel dry   Refresh few times a day            Last edited by Chelsey Seals MD on 5/17/2023 12:52 PM.                Assessment & Plan   Primary open angle glaucoma (POAG) of both eyes, severe stage  -     brimonidine 0.1% (ALPHAGAN P) 0.1 % Drop; Place 1 drop into both eyes 3 (three) times daily.  Dispense: 15 mL; Refill: 3  -     Yates Visual Field - OU - Extended - Both Eyes  -     Posterior Segment OCT Optic Nerve- Both eyes    Meibomian gland dysfunction (MGD) of both eyes    Keratoconjunctivitis sicca    Pseudophakia of both eyes    Juvenile glaucoma    Macular edema of left eye       Referral from Dr. Cool. Previously treated with Dr. Potter, Dr. Raymond.     POAG severe OU   JOAG - Dx'd at 20 y/o   -Fhx (+mtp family members), Steroids (-),Trauma(-)  -Drops: Rocklatan qHS OU // Dorzolamide-Timolol-PF BID OU // Restasis BID OU ==> All medications - 90-day [employer] mail order  -Drop intolerance/contraindication: Brimonidine redness  -Laser:  -Surgeries: Trab OU // IN BV OU // ST AVG OS // CE IOL OU  -CCT: 527 // 482 thin  -Gonio: 3+ with sclerostomies and scattered PAS  Tm 40-50?    5/23 HVF Paracentral scotoma VFI 87% OD // severe depression VFI 4% OS --> +prog OD  5/23 RNFL dec TI B G/T OD // dec G/TS/TI/NI/N B T/NS OS --> stable OU  Macula wnl OD // edema OS    Continue  Rocklatan qHS OU, dorzolamide-timolol-PF (was $0 for her)  Need lower IOP OD    Discussed brimonidine v acetazolamide v micropuse --> pt prefers to try brimonidine first   Has 2% at home but wants to try "P" to decrease risk of redness  Start Alphagan P TID " OU      Red eye OU  Multifactorial. Medicamentosa. MGD OU  Tried Lumify - not using, did not work  Restasis BID 11/22 OU  -- USE BID  Preservative free artifical tears 2-4x/day  Improved with PF Cosopt    Will try PF gtts      Macular edema OS  Chronic CME post AVG OS  Saw Dr. Simms - recommended to monitor       Allergic conjunctivitis OU  Not using Pataday any more       Pseudophakia  Lenses WC, monitor          PLAN  Continue  Rocklatan qHS OU, Cosopt-PF     Restasis started 11/22 - increase to BID     Try Alphagan -P TID OU      RTC 6 weeks with IOP check and HVF 10-2 OD only       Chelsey Seals M.D., M.S.  Department of Ophthalmology   Division of Glaucoma Surgery  [facility]

## 2023-05-17 ENCOUNTER — CLINICAL SUPPORT (OUTPATIENT)
Dept: OPHTHALMOLOGY | Facility: CLINIC | Age: 68
End: 2023-05-17
Payer: MEDICARE

## 2023-05-17 ENCOUNTER — OFFICE VISIT (OUTPATIENT)
Dept: OPHTHALMOLOGY | Facility: CLINIC | Age: 68
End: 2023-05-17
Payer: MEDICARE

## 2023-05-17 DIAGNOSIS — M35.01 KERATOCONJUNCTIVITIS SICCA: ICD-10-CM

## 2023-05-17 DIAGNOSIS — Z96.1 PSEUDOPHAKIA OF BOTH EYES: ICD-10-CM

## 2023-05-17 DIAGNOSIS — H02.883 MEIBOMIAN GLAND DYSFUNCTION (MGD) OF BOTH EYES: ICD-10-CM

## 2023-05-17 DIAGNOSIS — Q15.0 JUVENILE GLAUCOMA: ICD-10-CM

## 2023-05-17 DIAGNOSIS — H02.886 MEIBOMIAN GLAND DYSFUNCTION (MGD) OF BOTH EYES: ICD-10-CM

## 2023-05-17 DIAGNOSIS — H35.81 MACULAR EDEMA OF LEFT EYE: ICD-10-CM

## 2023-05-17 DIAGNOSIS — H40.1133 PRIMARY OPEN ANGLE GLAUCOMA (POAG) OF BOTH EYES, SEVERE STAGE: Primary | ICD-10-CM

## 2023-05-17 PROCEDURE — 1159F PR MEDICATION LIST DOCUMENTED IN MEDICAL RECORD: ICD-10-PCS | Mod: HCWC,CPTII,, | Performed by: STUDENT IN AN ORGANIZED HEALTH CARE EDUCATION/TRAINING PROGRAM

## 2023-05-17 PROCEDURE — 3288F FALL RISK ASSESSMENT DOCD: CPT | Mod: HCWC,CPTII,, | Performed by: STUDENT IN AN ORGANIZED HEALTH CARE EDUCATION/TRAINING PROGRAM

## 2023-05-17 PROCEDURE — 1126F PR PAIN SEVERITY QUANTIFIED, NO PAIN PRESENT: ICD-10-PCS | Mod: HCWC,CPTII,, | Performed by: STUDENT IN AN ORGANIZED HEALTH CARE EDUCATION/TRAINING PROGRAM

## 2023-05-17 PROCEDURE — 92083 HUMPHREY VISUAL FIELD - OU - BOTH EYES: ICD-10-PCS | Mod: HCWC,,, | Performed by: STUDENT IN AN ORGANIZED HEALTH CARE EDUCATION/TRAINING PROGRAM

## 2023-05-17 PROCEDURE — 99214 PR OFFICE/OUTPT VISIT, EST, LEVL IV, 30-39 MIN: ICD-10-PCS | Mod: HCWC,,, | Performed by: STUDENT IN AN ORGANIZED HEALTH CARE EDUCATION/TRAINING PROGRAM

## 2023-05-17 PROCEDURE — 92083 EXTENDED VISUAL FIELD XM: CPT | Mod: HCWC,,, | Performed by: STUDENT IN AN ORGANIZED HEALTH CARE EDUCATION/TRAINING PROGRAM

## 2023-05-17 PROCEDURE — 1101F PR PT FALLS ASSESS DOC 0-1 FALLS W/OUT INJ PAST YR: ICD-10-PCS | Mod: HCWC,CPTII,, | Performed by: STUDENT IN AN ORGANIZED HEALTH CARE EDUCATION/TRAINING PROGRAM

## 2023-05-17 PROCEDURE — 99999 PR PBB SHADOW E&M-EST. PATIENT-LVL II: ICD-10-PCS | Mod: PBBFAC,HCWC,, | Performed by: STUDENT IN AN ORGANIZED HEALTH CARE EDUCATION/TRAINING PROGRAM

## 2023-05-17 PROCEDURE — 1159F MED LIST DOCD IN RCRD: CPT | Mod: HCWC,CPTII,, | Performed by: STUDENT IN AN ORGANIZED HEALTH CARE EDUCATION/TRAINING PROGRAM

## 2023-05-17 PROCEDURE — 1160F PR REVIEW ALL MEDS BY PRESCRIBER/CLIN PHARMACIST DOCUMENTED: ICD-10-PCS | Mod: HCWC,CPTII,, | Performed by: STUDENT IN AN ORGANIZED HEALTH CARE EDUCATION/TRAINING PROGRAM

## 2023-05-17 PROCEDURE — 92133 CPTRZD OPH DX IMG PST SGM ON: CPT | Mod: HCWC,,, | Performed by: STUDENT IN AN ORGANIZED HEALTH CARE EDUCATION/TRAINING PROGRAM

## 2023-05-17 PROCEDURE — 3288F PR FALLS RISK ASSESSMENT DOCUMENTED: ICD-10-PCS | Mod: HCWC,CPTII,, | Performed by: STUDENT IN AN ORGANIZED HEALTH CARE EDUCATION/TRAINING PROGRAM

## 2023-05-17 PROCEDURE — 1126F AMNT PAIN NOTED NONE PRSNT: CPT | Mod: HCWC,CPTII,, | Performed by: STUDENT IN AN ORGANIZED HEALTH CARE EDUCATION/TRAINING PROGRAM

## 2023-05-17 PROCEDURE — 99999 PR PBB SHADOW E&M-EST. PATIENT-LVL II: CPT | Mod: PBBFAC,HCWC,, | Performed by: STUDENT IN AN ORGANIZED HEALTH CARE EDUCATION/TRAINING PROGRAM

## 2023-05-17 PROCEDURE — 1160F RVW MEDS BY RX/DR IN RCRD: CPT | Mod: HCWC,CPTII,, | Performed by: STUDENT IN AN ORGANIZED HEALTH CARE EDUCATION/TRAINING PROGRAM

## 2023-05-17 PROCEDURE — 92133 POSTERIOR SEGMENT OCT OPTIC NERVE(OCULAR COHERENCE TOMOGRAPHY) - OU - BOTH EYES: ICD-10-PCS | Mod: HCWC,,, | Performed by: STUDENT IN AN ORGANIZED HEALTH CARE EDUCATION/TRAINING PROGRAM

## 2023-05-17 PROCEDURE — 1101F PT FALLS ASSESS-DOCD LE1/YR: CPT | Mod: HCWC,CPTII,, | Performed by: STUDENT IN AN ORGANIZED HEALTH CARE EDUCATION/TRAINING PROGRAM

## 2023-05-17 PROCEDURE — 99214 OFFICE O/P EST MOD 30 MIN: CPT | Mod: HCWC,,, | Performed by: STUDENT IN AN ORGANIZED HEALTH CARE EDUCATION/TRAINING PROGRAM

## 2023-05-17 RX ORDER — HYDROCHLOROTHIAZIDE 12.5 MG/1
TABLET ORAL
COMMUNITY
Start: 2023-03-14

## 2023-05-17 RX ORDER — BRIMONIDINE TARTRATE 1 MG/ML
1 SOLUTION/ DROPS OPHTHALMIC 3 TIMES DAILY
Qty: 15 ML | Refills: 3 | Status: SHIPPED | OUTPATIENT
Start: 2023-05-17 | End: 2023-08-28

## 2023-05-17 NOTE — PROGRESS NOTES
Informed Mabel that a prescription for Valium was sent and that she should not make any changes to her medications. Mabel verbalized understanding.   Visual field test done.  Patient stated no latex allergies used coverlet      Glasses Prescription     Sphere Cylinder Axis Dist VA Add   Right -2.00 +2.25 060 20/25-1 +2.75   Left Balance    +2.75   Expiration Date: 11/23/2023

## 2023-06-19 NOTE — PROGRESS NOTES
Subjective:       Patient ID: Carina Rios is a 67 y.o. female.    Chief Complaint: Concerns About Ocular Health     HPI    Presents today for 6 weeks with IOP check and HVF 10-2 OD only. Pt states   no complaints since last visit.    Eye Meds:  Cosopt OU  Alphagan OU  Restasis  OU  Last edited by Bee Oneal MA on 6/20/2023 10:50 AM.              Assessment & Plan   Primary open angle glaucoma (POAG) of both eyes, severe stage    Meibomian gland dysfunction (MGD) of both eyes    Keratoconjunctivitis sicca    Pseudophakia of both eyes    Juvenile glaucoma          Referral from Dr. Cool. Previously treated with Dr. Potter, Dr. Raymond.     POAG severe OU   JOAG - Dx'd at 22 y/o   -Fhx (+mtp family members), Steroids (-),Trauma(-)  -Drops: Rocklatan qHS OU // Dorzolamide-Timolol-PF BID OU // Restasis BID OU ==> All medications - 90-day [employer] mail order  -Drop intolerance/contraindication: Brimonidine redness  -Laser:  -Surgeries: Trab OU // IN BV OU // ST AVG OS // CE IOL OU  -CCT: 527 // 482 thin  -Gonio: 3+ with sclerostomies and scattered PAS  Tm 40-50?    5/23 HVF Paracentral scotoma VFI 87% OD // severe depression VFI 4% OS --> +prog OD  5/23 RNFL dec TI B G/T OD // dec G/TS/TI/NI/N B T/NS OS --> stable OU  Macula wnl OD // edema OS    6/23 HVF10-2 ST defect and potential early SAD MD-7.94 OD only    Continue  Rocklatan qHS OU, dorzolamide-timolol-PF (was $0 for her)  IOP a little better OD today   May or may not be low enough     Continue Alphagan P TID OU for now       Red eye OU  Multifactorial. Medicamentosa. MGD OU  Tried Lumify - not using, did not work  Restasis BID 11/22 OU  -- USE BID  Preservative free artifical tears 2-4x/day  Improved with PF Cosopt    Will try PF gtts      Macular edema OS  Chronic CME post AVG OS  Saw Dr. Simms - recommended to monitor       Allergic conjunctivitis OU  Not using Pataday any more       Pseudophakia  Lenses , monitor          PLAN  Continue Rocklatan  qHS OU, Cosopt-PF     Restasis started 11/22 - increase to BID     Alphagan -P TID OU      RTC 8 weeks with IOP check OU      Chelsey Seals M.D., M.S.  Department of Ophthalmology   Division of Glaucoma Surgery  [facility]

## 2023-06-20 ENCOUNTER — OFFICE VISIT (OUTPATIENT)
Dept: OPHTHALMOLOGY | Facility: CLINIC | Age: 68
End: 2023-06-20
Payer: MEDICARE

## 2023-06-20 DIAGNOSIS — H02.886 MEIBOMIAN GLAND DYSFUNCTION (MGD) OF BOTH EYES: ICD-10-CM

## 2023-06-20 DIAGNOSIS — Z96.1 PSEUDOPHAKIA OF BOTH EYES: ICD-10-CM

## 2023-06-20 DIAGNOSIS — H02.883 MEIBOMIAN GLAND DYSFUNCTION (MGD) OF BOTH EYES: ICD-10-CM

## 2023-06-20 DIAGNOSIS — Q15.0 JUVENILE GLAUCOMA: ICD-10-CM

## 2023-06-20 DIAGNOSIS — M35.01 KERATOCONJUNCTIVITIS SICCA: ICD-10-CM

## 2023-06-20 DIAGNOSIS — H40.1133 PRIMARY OPEN ANGLE GLAUCOMA (POAG) OF BOTH EYES, SEVERE STAGE: Primary | ICD-10-CM

## 2023-06-20 PROCEDURE — 3288F FALL RISK ASSESSMENT DOCD: CPT | Mod: HCWC,CPTII,S$GLB, | Performed by: STUDENT IN AN ORGANIZED HEALTH CARE EDUCATION/TRAINING PROGRAM

## 2023-06-20 PROCEDURE — 1101F PT FALLS ASSESS-DOCD LE1/YR: CPT | Mod: HCWC,CPTII,S$GLB, | Performed by: STUDENT IN AN ORGANIZED HEALTH CARE EDUCATION/TRAINING PROGRAM

## 2023-06-20 PROCEDURE — 1126F PR PAIN SEVERITY QUANTIFIED, NO PAIN PRESENT: ICD-10-PCS | Mod: HCWC,CPTII,S$GLB, | Performed by: STUDENT IN AN ORGANIZED HEALTH CARE EDUCATION/TRAINING PROGRAM

## 2023-06-20 PROCEDURE — 99214 OFFICE O/P EST MOD 30 MIN: CPT | Mod: HCWC,S$GLB,, | Performed by: STUDENT IN AN ORGANIZED HEALTH CARE EDUCATION/TRAINING PROGRAM

## 2023-06-20 PROCEDURE — 4010F PR ACE/ARB THEARPY RXD/TAKEN: ICD-10-PCS | Mod: HCWC,CPTII,S$GLB, | Performed by: STUDENT IN AN ORGANIZED HEALTH CARE EDUCATION/TRAINING PROGRAM

## 2023-06-20 PROCEDURE — 1101F PR PT FALLS ASSESS DOC 0-1 FALLS W/OUT INJ PAST YR: ICD-10-PCS | Mod: HCWC,CPTII,S$GLB, | Performed by: STUDENT IN AN ORGANIZED HEALTH CARE EDUCATION/TRAINING PROGRAM

## 2023-06-20 PROCEDURE — 1126F AMNT PAIN NOTED NONE PRSNT: CPT | Mod: HCWC,CPTII,S$GLB, | Performed by: STUDENT IN AN ORGANIZED HEALTH CARE EDUCATION/TRAINING PROGRAM

## 2023-06-20 PROCEDURE — 1159F MED LIST DOCD IN RCRD: CPT | Mod: HCWC,CPTII,S$GLB, | Performed by: STUDENT IN AN ORGANIZED HEALTH CARE EDUCATION/TRAINING PROGRAM

## 2023-06-20 PROCEDURE — 99999 PR PBB SHADOW E&M-EST. PATIENT-LVL III: ICD-10-PCS | Mod: PBBFAC,HCWC,, | Performed by: STUDENT IN AN ORGANIZED HEALTH CARE EDUCATION/TRAINING PROGRAM

## 2023-06-20 PROCEDURE — 99999 PR PBB SHADOW E&M-EST. PATIENT-LVL III: CPT | Mod: PBBFAC,HCWC,, | Performed by: STUDENT IN AN ORGANIZED HEALTH CARE EDUCATION/TRAINING PROGRAM

## 2023-06-20 PROCEDURE — 1160F PR REVIEW ALL MEDS BY PRESCRIBER/CLIN PHARMACIST DOCUMENTED: ICD-10-PCS | Mod: HCWC,CPTII,S$GLB, | Performed by: STUDENT IN AN ORGANIZED HEALTH CARE EDUCATION/TRAINING PROGRAM

## 2023-06-20 PROCEDURE — 4010F ACE/ARB THERAPY RXD/TAKEN: CPT | Mod: HCWC,CPTII,S$GLB, | Performed by: STUDENT IN AN ORGANIZED HEALTH CARE EDUCATION/TRAINING PROGRAM

## 2023-06-20 PROCEDURE — 3288F PR FALLS RISK ASSESSMENT DOCUMENTED: ICD-10-PCS | Mod: HCWC,CPTII,S$GLB, | Performed by: STUDENT IN AN ORGANIZED HEALTH CARE EDUCATION/TRAINING PROGRAM

## 2023-06-20 PROCEDURE — 1159F PR MEDICATION LIST DOCUMENTED IN MEDICAL RECORD: ICD-10-PCS | Mod: HCWC,CPTII,S$GLB, | Performed by: STUDENT IN AN ORGANIZED HEALTH CARE EDUCATION/TRAINING PROGRAM

## 2023-06-20 PROCEDURE — 99214 PR OFFICE/OUTPT VISIT, EST, LEVL IV, 30-39 MIN: ICD-10-PCS | Mod: HCWC,S$GLB,, | Performed by: STUDENT IN AN ORGANIZED HEALTH CARE EDUCATION/TRAINING PROGRAM

## 2023-06-20 PROCEDURE — 1160F RVW MEDS BY RX/DR IN RCRD: CPT | Mod: HCWC,CPTII,S$GLB, | Performed by: STUDENT IN AN ORGANIZED HEALTH CARE EDUCATION/TRAINING PROGRAM

## 2023-06-20 RX ORDER — LOSARTAN POTASSIUM 25 MG/1
25 TABLET ORAL
COMMUNITY
Start: 2023-06-13

## 2023-08-14 PROBLEM — H54.10 BLINDNESS AND LOW VISION: Status: ACTIVE | Noted: 2023-08-14

## 2023-08-14 NOTE — PROGRESS NOTES
Subjective:       Patient ID: Carina Rios is a 67 y.o. female.    Chief Complaint: Glaucoma (2 mon iop chk)     HPI     Glaucoma            Comments: 2 mon iop chk          Comments    Presents today for 2 mon iop chk. Pt feels like right eye is hazy.     Eye Meds:  Cosopt BID OU  Alphagan TID OU  Restasis TID OU  Rockaltan QHS OU          Last edited by Chelsey Seals MD on 8/15/2023  1:32 PM.            Assessment & Plan   Primary open angle glaucoma (POAG) of left eye, severe stage    Blindness and low vision    Macular edema of left eye    Juvenile glaucoma       Referral from Dr. Cool. Previously treated with Dr. Potter, Dr. Raymond.     POAG severe OU   JOAG - Dx'd at 20 y/o   -Fhx (+mtp family members), Steroids (-),Trauma(-)  -Drops: Rocklatan qHS OU // Dorzolamide-Timolol-PF BID OU // Alphagan P TID OU // Restasis BID OU ==> All medications - 90-day [employer] mail order  -Drop intolerance/contraindication: regular Brimonidine redness  -Laser:  -Surgeries: Trab OU // IN BV OU // ST AVG OS // CE IOL OU  -CCT: 527 // 482 thin  -Gonio: 3+ with sclerostomies and scattered PAS  Tm 40-50?    5/23 HVF Paracentral scotoma VFI 87% OD // severe depression VFI 4% OS --> +prog OD  5/23 RNFL dec TI B G/T OD // dec G/TS/TI/NI/N B T/NS OS --> stable OU  Macula wnl OD // edema OS    6/23 HVF10-2 ST defect and potential early SAD MD-7.94 OD only    Continue  Rocklatan qHS OU, dorzolamide-timolol-PF (was $0 for her)  Continue Alphagan P TID OU for now     Can try micropulse       Red eye OU  Multifactorial. Medicamentosa. MGD OU  Tried Lumify - not using, did not work  Restasis BID 11/22 OU  -- USE BID  Preservative free artifical tears 2-4x/day  Improved with PF Cosopt    Will try PF gtts      Macular edema OS  Chronic CME post AVG OS  Saw Dr. Simms - recommended to monitor       Allergic conjunctivitis OU  Not using Pataday any more       Pseudophakia  Lenses WC, monitor          PLAN  Continue present management  with drops for now      RTC 3 months with HVF 10-2 OD, IOP check OU, and DFE         Chelsey Seals M.D., M.S.  Department of Ophthalmology   Division of Glaucoma Surgery  [facility]

## 2023-08-15 ENCOUNTER — OFFICE VISIT (OUTPATIENT)
Dept: OPHTHALMOLOGY | Facility: CLINIC | Age: 68
End: 2023-08-15
Payer: MEDICARE

## 2023-08-15 DIAGNOSIS — H54.10 BLINDNESS AND LOW VISION: ICD-10-CM

## 2023-08-15 DIAGNOSIS — Q15.0 JUVENILE GLAUCOMA: ICD-10-CM

## 2023-08-15 DIAGNOSIS — H35.81 MACULAR EDEMA OF LEFT EYE: ICD-10-CM

## 2023-08-15 DIAGNOSIS — H40.1123 PRIMARY OPEN ANGLE GLAUCOMA (POAG) OF LEFT EYE, SEVERE STAGE: Primary | ICD-10-CM

## 2023-08-15 PROCEDURE — 1101F PT FALLS ASSESS-DOCD LE1/YR: CPT | Mod: HCWC,CPTII,S$GLB, | Performed by: STUDENT IN AN ORGANIZED HEALTH CARE EDUCATION/TRAINING PROGRAM

## 2023-08-15 PROCEDURE — 99214 PR OFFICE/OUTPT VISIT, EST, LEVL IV, 30-39 MIN: ICD-10-PCS | Mod: HCWC,S$GLB,, | Performed by: STUDENT IN AN ORGANIZED HEALTH CARE EDUCATION/TRAINING PROGRAM

## 2023-08-15 PROCEDURE — 1160F PR REVIEW ALL MEDS BY PRESCRIBER/CLIN PHARMACIST DOCUMENTED: ICD-10-PCS | Mod: HCWC,CPTII,S$GLB, | Performed by: STUDENT IN AN ORGANIZED HEALTH CARE EDUCATION/TRAINING PROGRAM

## 2023-08-15 PROCEDURE — 3288F PR FALLS RISK ASSESSMENT DOCUMENTED: ICD-10-PCS | Mod: HCWC,CPTII,S$GLB, | Performed by: STUDENT IN AN ORGANIZED HEALTH CARE EDUCATION/TRAINING PROGRAM

## 2023-08-15 PROCEDURE — 4010F ACE/ARB THERAPY RXD/TAKEN: CPT | Mod: HCWC,CPTII,S$GLB, | Performed by: STUDENT IN AN ORGANIZED HEALTH CARE EDUCATION/TRAINING PROGRAM

## 2023-08-15 PROCEDURE — 1126F PR PAIN SEVERITY QUANTIFIED, NO PAIN PRESENT: ICD-10-PCS | Mod: HCWC,CPTII,S$GLB, | Performed by: STUDENT IN AN ORGANIZED HEALTH CARE EDUCATION/TRAINING PROGRAM

## 2023-08-15 PROCEDURE — 1126F AMNT PAIN NOTED NONE PRSNT: CPT | Mod: HCWC,CPTII,S$GLB, | Performed by: STUDENT IN AN ORGANIZED HEALTH CARE EDUCATION/TRAINING PROGRAM

## 2023-08-15 PROCEDURE — 3288F FALL RISK ASSESSMENT DOCD: CPT | Mod: HCWC,CPTII,S$GLB, | Performed by: STUDENT IN AN ORGANIZED HEALTH CARE EDUCATION/TRAINING PROGRAM

## 2023-08-15 PROCEDURE — 99214 OFFICE O/P EST MOD 30 MIN: CPT | Mod: HCWC,S$GLB,, | Performed by: STUDENT IN AN ORGANIZED HEALTH CARE EDUCATION/TRAINING PROGRAM

## 2023-08-15 PROCEDURE — 1160F RVW MEDS BY RX/DR IN RCRD: CPT | Mod: HCWC,CPTII,S$GLB, | Performed by: STUDENT IN AN ORGANIZED HEALTH CARE EDUCATION/TRAINING PROGRAM

## 2023-08-15 PROCEDURE — 99999 PR PBB SHADOW E&M-EST. PATIENT-LVL III: CPT | Mod: PBBFAC,HCWC,, | Performed by: STUDENT IN AN ORGANIZED HEALTH CARE EDUCATION/TRAINING PROGRAM

## 2023-08-15 PROCEDURE — 4010F PR ACE/ARB THEARPY RXD/TAKEN: ICD-10-PCS | Mod: HCWC,CPTII,S$GLB, | Performed by: STUDENT IN AN ORGANIZED HEALTH CARE EDUCATION/TRAINING PROGRAM

## 2023-08-15 PROCEDURE — 1159F PR MEDICATION LIST DOCUMENTED IN MEDICAL RECORD: ICD-10-PCS | Mod: HCWC,CPTII,S$GLB, | Performed by: STUDENT IN AN ORGANIZED HEALTH CARE EDUCATION/TRAINING PROGRAM

## 2023-08-15 PROCEDURE — 1159F MED LIST DOCD IN RCRD: CPT | Mod: HCWC,CPTII,S$GLB, | Performed by: STUDENT IN AN ORGANIZED HEALTH CARE EDUCATION/TRAINING PROGRAM

## 2023-08-15 PROCEDURE — 99999 PR PBB SHADOW E&M-EST. PATIENT-LVL III: ICD-10-PCS | Mod: PBBFAC,HCWC,, | Performed by: STUDENT IN AN ORGANIZED HEALTH CARE EDUCATION/TRAINING PROGRAM

## 2023-08-15 PROCEDURE — 1101F PR PT FALLS ASSESS DOC 0-1 FALLS W/OUT INJ PAST YR: ICD-10-PCS | Mod: HCWC,CPTII,S$GLB, | Performed by: STUDENT IN AN ORGANIZED HEALTH CARE EDUCATION/TRAINING PROGRAM

## 2023-08-15 RX ORDER — TRAMADOL HYDROCHLORIDE 50 MG/1
TABLET ORAL
COMMUNITY
Start: 2023-07-28

## 2023-08-23 ENCOUNTER — HOSPITAL ENCOUNTER (OUTPATIENT)
Dept: RADIOLOGY | Facility: HOSPITAL | Age: 68
Discharge: HOME | End: 2023-08-23
Attending: FAMILY MEDICINE
Payer: MEDICARE

## 2023-08-23 DIAGNOSIS — R07.9 CHEST PAIN, UNSPECIFIED: ICD-10-CM

## 2023-08-23 DIAGNOSIS — R07.9 CHEST PAIN, UNSPECIFIED: Primary | ICD-10-CM

## 2023-08-23 PROCEDURE — 71046 X-RAY EXAM CHEST 2 VIEWS: CPT | Mod: TC,HCWC,FY

## 2023-08-23 PROCEDURE — 71046 X-RAY EXAM CHEST 2 VIEWS: CPT | Mod: 26,HCWC,, | Performed by: RADIOLOGY

## 2023-08-23 PROCEDURE — 71046 XR CHEST PA AND LATERAL: ICD-10-PCS | Mod: 26,HCWC,, | Performed by: RADIOLOGY

## 2023-08-24 DIAGNOSIS — H40.1133 PRIMARY OPEN ANGLE GLAUCOMA (POAG) OF BOTH EYES, SEVERE STAGE: ICD-10-CM

## 2023-08-24 DIAGNOSIS — R07.9 CHEST PAIN, UNSPECIFIED: Primary | ICD-10-CM

## 2023-08-24 DIAGNOSIS — Z12.31 SCREENING MAMMOGRAM FOR BREAST CANCER: Primary | ICD-10-CM

## 2023-08-28 ENCOUNTER — HOSPITAL ENCOUNTER (OUTPATIENT)
Dept: RADIOLOGY | Facility: HOSPITAL | Age: 68
Discharge: HOME | End: 2023-08-28
Attending: INTERNAL MEDICINE
Payer: MEDICARE

## 2023-08-28 DIAGNOSIS — Z12.31 SCREENING MAMMOGRAM FOR BREAST CANCER: ICD-10-CM

## 2023-08-28 RX ORDER — BRIMONIDINE TARTRATE 1 MG/ML
SOLUTION/ DROPS OPHTHALMIC
Qty: 25 ML | Refills: 6 | Status: SHIPPED | OUTPATIENT
Start: 2023-08-28

## 2023-09-29 ENCOUNTER — HOSPITAL ENCOUNTER (OUTPATIENT)
Dept: RADIOLOGY | Facility: HOSPITAL | Age: 68
Discharge: HOME | End: 2023-09-29
Attending: INTERNAL MEDICINE
Payer: MEDICARE

## 2023-09-29 PROCEDURE — 77063 BREAST TOMOSYNTHESIS BI: CPT | Mod: 26,HCWC,, | Performed by: RADIOLOGY

## 2023-09-29 PROCEDURE — 77067 SCR MAMMO BI INCL CAD: CPT | Mod: 26,HCWC,, | Performed by: RADIOLOGY

## 2023-09-29 PROCEDURE — 77067 MAMMO DIGITAL SCREENING BILAT WITH TOMO: ICD-10-PCS | Mod: 26,HCWC,, | Performed by: RADIOLOGY

## 2023-09-29 PROCEDURE — 77063 MAMMO DIGITAL SCREENING BILAT WITH TOMO: ICD-10-PCS | Mod: 26,HCWC,, | Performed by: RADIOLOGY

## 2023-09-29 PROCEDURE — 77067 SCR MAMMO BI INCL CAD: CPT | Mod: TC,HCWC

## 2023-11-13 ENCOUNTER — TELEPHONE (OUTPATIENT)
Dept: OPTOMETRY | Facility: CLINIC | Age: 68
End: 2023-11-13
Payer: MEDICARE

## 2023-11-13 ENCOUNTER — PATIENT MESSAGE (OUTPATIENT)
Dept: OPTOMETRY | Facility: CLINIC | Age: 68
End: 2023-11-13
Payer: MEDICARE

## 2023-11-13 NOTE — TELEPHONE ENCOUNTER
----- Message from Lashawn Sol sent at 11/13/2023  1:01 PM CST -----  Regarding: Patient Returning Missed Call  Patient is returning missed call to schedule appt.     Pts call back- [phone]

## 2023-12-04 NOTE — PROGRESS NOTES
"Subjective:       Patient ID: Carina Rios is a 68 y.o. female.    Chief Complaint: Glaucoma (3 mon iop chk/hvf rev/dfe)     HPI     Glaucoma            Comments: 3 mon iop chk/hvf rev/dfe          Comments    Presents today for 3 mon iop chk/hvf rev/dfe.   Pt states her va seems to be "fluctuating" at times. No pain. No   discharge.    Eye Meds:  Cosopt BID OU  Alphagan QD OU  Restasis PRN OU  Rockaltan QHS OU          Last edited by Jeffrey Alicia on 12/5/2023  9:11 AM.              Assessment & Plan   Primary open angle glaucoma (POAG) of left eye, severe stage  -     acetaZOLAMIDE (DIAMOX) 500 mg CpSR; Take 1 capsule (500 mg total) by mouth 2 (two) times daily.  Dispense: 60 capsule; Refill: 11  -     Yates Visual Field - OU - Extended - Both Eyes    Blindness and low vision    Macular edema of left eye    Juvenile glaucoma    Primary open angle glaucoma (POAG) of both eyes, severe stage    Meibomian gland dysfunction (MGD) of both eyes    Keratoconjunctivitis sicca         Referral from Dr. Cool. Previously treated with Dr. Potter, Dr. Raymond.     POAG severe OU   JOAG - Dx'd at 22 y/o   -Fhx (+mtp family members), Steroids (-),Trauma(-)  -Drops: Rocklatan qHS OU // Dorzolamide-Timolol-PF BID OU // Alphagan P TID OU // Restasis BID OU ==> All medications - 90-day [employer] mail order  -Drop intolerance/contraindication: regular Brimonidine redness  -Laser:  -Surgeries: Trab OU // IN BV OU // ST AVG OS // CE IOL OU  -CCT: 527 // 482 thin  -Gonio: 3+ with sclerostomies and scattered PAS  Tm 40-50?    5/23 HVF Paracentral scotoma VFI 87% OD // severe depression VFI 4% OS --> +prog OD  5/23 RNFL dec TI B G/T OD // dec G/TS/TI/NI/N B T/NS OS --> stable OU  Macula wnl OD // edema OS    12/23 HVF10-2 ST defect and potential early SAD MD -6.51 OD only --> mild + prog    Continue  Rocklatan qHS OU, dorzolamide-timolol-PF (was $0 for her)  Continue Alphagan P TID OU for now     Start diamox 500 ER BID "   CBC/CMP  Discussed options - surgery, diamox, jenifer, laser --> pt prefers diamox  Discussed side effect profile      Red eye OU  Multifactorial. Medicamentosa. MGD OU  Tried Lumify - not using, did not work  Restasis BID 11/22 OU  -- USE BID  Preservative free artifical tears 2-4x/day  Improved with PF Cosopt    Will try PF gtts      Macular edema OS  Chronic CME post AVG OS  Saw Dr. Simms - recommended to monitor       Allergic conjunctivitis OU  Not using Pataday any more       Pseudophakia  Lenses WC, monitor          PLAN  Continue present management with drops for now  Start diamox 500 ER BID   CBC/CMP    RTC 1 month IOP check       Chelsey Seals M.D., M.S.  Department of Ophthalmology   Division of Glaucoma Surgery  [facility]

## 2023-12-05 ENCOUNTER — OFFICE VISIT (OUTPATIENT)
Dept: OPHTHALMOLOGY | Facility: CLINIC | Age: 68
End: 2023-12-05
Payer: MEDICARE

## 2023-12-05 DIAGNOSIS — H02.886 MEIBOMIAN GLAND DYSFUNCTION (MGD) OF BOTH EYES: ICD-10-CM

## 2023-12-05 DIAGNOSIS — H40.1123 PRIMARY OPEN ANGLE GLAUCOMA (POAG) OF LEFT EYE, SEVERE STAGE: Primary | ICD-10-CM

## 2023-12-05 DIAGNOSIS — H02.883 MEIBOMIAN GLAND DYSFUNCTION (MGD) OF BOTH EYES: ICD-10-CM

## 2023-12-05 DIAGNOSIS — H54.10 BLINDNESS AND LOW VISION: ICD-10-CM

## 2023-12-05 DIAGNOSIS — H40.1133 PRIMARY OPEN ANGLE GLAUCOMA (POAG) OF BOTH EYES, SEVERE STAGE: ICD-10-CM

## 2023-12-05 DIAGNOSIS — M35.01 KERATOCONJUNCTIVITIS SICCA: ICD-10-CM

## 2023-12-05 DIAGNOSIS — H35.81 MACULAR EDEMA OF LEFT EYE: ICD-10-CM

## 2023-12-05 DIAGNOSIS — Q15.0 JUVENILE GLAUCOMA: ICD-10-CM

## 2023-12-05 PROCEDURE — 99214 PR OFFICE/OUTPT VISIT, EST, LEVL IV, 30-39 MIN: ICD-10-PCS | Mod: HCWC,S$GLB,, | Performed by: STUDENT IN AN ORGANIZED HEALTH CARE EDUCATION/TRAINING PROGRAM

## 2023-12-05 PROCEDURE — 3288F FALL RISK ASSESSMENT DOCD: CPT | Mod: HCWC,CPTII,S$GLB, | Performed by: STUDENT IN AN ORGANIZED HEALTH CARE EDUCATION/TRAINING PROGRAM

## 2023-12-05 PROCEDURE — 92083 HUMPHREY VISUAL FIELD - OU - BOTH EYES: ICD-10-PCS | Mod: HCWC,S$GLB,, | Performed by: STUDENT IN AN ORGANIZED HEALTH CARE EDUCATION/TRAINING PROGRAM

## 2023-12-05 PROCEDURE — 4010F ACE/ARB THERAPY RXD/TAKEN: CPT | Mod: HCWC,CPTII,S$GLB, | Performed by: STUDENT IN AN ORGANIZED HEALTH CARE EDUCATION/TRAINING PROGRAM

## 2023-12-05 PROCEDURE — 1101F PT FALLS ASSESS-DOCD LE1/YR: CPT | Mod: HCWC,CPTII,S$GLB, | Performed by: STUDENT IN AN ORGANIZED HEALTH CARE EDUCATION/TRAINING PROGRAM

## 2023-12-05 PROCEDURE — 1159F MED LIST DOCD IN RCRD: CPT | Mod: HCWC,CPTII,S$GLB, | Performed by: STUDENT IN AN ORGANIZED HEALTH CARE EDUCATION/TRAINING PROGRAM

## 2023-12-05 PROCEDURE — 4010F PR ACE/ARB THEARPY RXD/TAKEN: ICD-10-PCS | Mod: HCWC,CPTII,S$GLB, | Performed by: STUDENT IN AN ORGANIZED HEALTH CARE EDUCATION/TRAINING PROGRAM

## 2023-12-05 PROCEDURE — 92083 EXTENDED VISUAL FIELD XM: CPT | Mod: HCWC,S$GLB,, | Performed by: STUDENT IN AN ORGANIZED HEALTH CARE EDUCATION/TRAINING PROGRAM

## 2023-12-05 PROCEDURE — 99214 OFFICE O/P EST MOD 30 MIN: CPT | Mod: HCWC,S$GLB,, | Performed by: STUDENT IN AN ORGANIZED HEALTH CARE EDUCATION/TRAINING PROGRAM

## 2023-12-05 PROCEDURE — 1101F PR PT FALLS ASSESS DOC 0-1 FALLS W/OUT INJ PAST YR: ICD-10-PCS | Mod: HCWC,CPTII,S$GLB, | Performed by: STUDENT IN AN ORGANIZED HEALTH CARE EDUCATION/TRAINING PROGRAM

## 2023-12-05 PROCEDURE — 3288F PR FALLS RISK ASSESSMENT DOCUMENTED: ICD-10-PCS | Mod: HCWC,CPTII,S$GLB, | Performed by: STUDENT IN AN ORGANIZED HEALTH CARE EDUCATION/TRAINING PROGRAM

## 2023-12-05 PROCEDURE — 99999 PR PBB SHADOW E&M-EST. PATIENT-LVL III: CPT | Mod: PBBFAC,HCWC,, | Performed by: STUDENT IN AN ORGANIZED HEALTH CARE EDUCATION/TRAINING PROGRAM

## 2023-12-05 PROCEDURE — 1126F PR PAIN SEVERITY QUANTIFIED, NO PAIN PRESENT: ICD-10-PCS | Mod: HCWC,CPTII,S$GLB, | Performed by: STUDENT IN AN ORGANIZED HEALTH CARE EDUCATION/TRAINING PROGRAM

## 2023-12-05 PROCEDURE — 1160F PR REVIEW ALL MEDS BY PRESCRIBER/CLIN PHARMACIST DOCUMENTED: ICD-10-PCS | Mod: HCWC,CPTII,S$GLB, | Performed by: STUDENT IN AN ORGANIZED HEALTH CARE EDUCATION/TRAINING PROGRAM

## 2023-12-05 PROCEDURE — 1160F RVW MEDS BY RX/DR IN RCRD: CPT | Mod: HCWC,CPTII,S$GLB, | Performed by: STUDENT IN AN ORGANIZED HEALTH CARE EDUCATION/TRAINING PROGRAM

## 2023-12-05 PROCEDURE — 1126F AMNT PAIN NOTED NONE PRSNT: CPT | Mod: HCWC,CPTII,S$GLB, | Performed by: STUDENT IN AN ORGANIZED HEALTH CARE EDUCATION/TRAINING PROGRAM

## 2023-12-05 PROCEDURE — 1159F PR MEDICATION LIST DOCUMENTED IN MEDICAL RECORD: ICD-10-PCS | Mod: HCWC,CPTII,S$GLB, | Performed by: STUDENT IN AN ORGANIZED HEALTH CARE EDUCATION/TRAINING PROGRAM

## 2023-12-05 PROCEDURE — 99999 PR PBB SHADOW E&M-EST. PATIENT-LVL III: ICD-10-PCS | Mod: PBBFAC,HCWC,, | Performed by: STUDENT IN AN ORGANIZED HEALTH CARE EDUCATION/TRAINING PROGRAM

## 2023-12-05 RX ORDER — DICLOFENAC SODIUM 10 MG/G
GEL TOPICAL
COMMUNITY
Start: 2023-09-19

## 2023-12-05 RX ORDER — ACETAZOLAMIDE 500 MG/1
500 CAPSULE, EXTENDED RELEASE ORAL 2 TIMES DAILY
Qty: 60 CAPSULE | Refills: 11 | Status: SHIPPED | OUTPATIENT
Start: 2023-12-05 | End: 2024-12-04

## 2023-12-05 NOTE — PATIENT INSTRUCTIONS
Rocklatan (WHITE TOP) 1 drop at night time in BOTH eyes --                                       PM    Dorzolamide-timolol-PF (little vials) 1 drop two times a day in BOTH eyes -- AM                 PM    Alphagan-P (PURPLE TOP) 1 drop three times a day in BOTH eyes --        AM    NOON     PM    Restasis (cyclosporine - little vial) 1 drop two times a day in BOTH eyes -- AM                    PM

## 2024-02-16 RX ORDER — DORZOLAMIDE HYDROCHLORIDE AND TIMOLOL MALEATE PRESERVATIVE FREE 20; 5 MG/ML; MG/ML
1 SOLUTION/ DROPS OPHTHALMIC 2 TIMES DAILY
Qty: 180 EACH | Refills: 3 | Status: SHIPPED | OUTPATIENT
Start: 2024-02-16 | End: 2024-05-28 | Stop reason: SDUPTHER

## 2024-02-26 NOTE — PROGRESS NOTES
Subjective:       Patient ID: Carina Rios is a 68 y.o. female.    Chief Complaint: Glaucoma     HPI    DLS: 12/5/2023 Dr. Seals    Pt presents today for IOP check. Pt states no vision changes since last   visit. Pt states on and off eye pain on top of left eye x 1 month.    She tried the diamox but she stopped due to nausea and headaches - she   tried it for a week.    Eye Meds:  Cosopt BID OU  Alphagan QD OU  Restasis PRN OU  Rockaltan qHS OU  Last edited by Chelsey Seals MD on 2/27/2024  8:42 AM.                Assessment & Plan   Primary open angle glaucoma (POAG) of left eye, severe stage    Blindness and low vision    Juvenile glaucoma    Macular edema of left eye    Primary open angle glaucoma (POAG) of both eyes, severe stage    Meibomian gland dysfunction (MGD) of both eyes    Keratoconjunctivitis sicca    Pseudophakia of both eyes      Referral from Dr. Cool. Previously treated with Dr. Potter, Dr. Raymond.     POAG severe OU   JOAG - Dx'd at 22 y/o   -Fhx (+mtp family members), Steroids (-),Trauma(-)  -Drops: Rocklatan qHS OU // Dorzolamide-Timolol-PF BID OU // Alphagan P TID OU // Restasis BID OU ==> All medications - 90-day [employer] mail order  -Drop intolerance/contraindication: regular Brimonidine redness, intolerant to diamox 500 ER (headache, nausea)  -Laser:  -Surgeries: Trab OU // IN BV OU // ST AVG OS // CE IOL OU  -CCT: 527 // 482 thin  -Gonio: 3+ with sclerostomies and scattered PAS  Tm 40-50?    5/23 HVF Paracentral scotoma VFI 87% OD // severe depression VFI 4% OS --> +prog OD  5/23 RNFL dec TI B G/T OD // dec G/TS/TI/NI/N B T/NS OS --> stable OU  Macula wnl OD // edema OS    12/23 HVF10-2 ST defect and potential early SAD MD -6.51 OD only --> mild + prog    Continue  Rocklatan qHS OU, dorzolamide-timolol-PF (was $0 for her)  Continue Alphagan P TID OU for now     Started diamox 500 ER BID --> did not tolerate --> will not continue       Red eye OU -- preservative  intolerance  Multifactorial. Medicamentosa. MGD OU  Tried Lumify - not using, did not work  Restasis BID 11/22 OU  -- USE BID  Preservative free artifical tears 2-4x/day  Improved with PF Cosopt    Will try PF gtts      Macular edema OS  Chronic CME post AVG OS  Saw Dr. Simms - recommended to monitor       Allergic conjunctivitis OU  Not using Pataday any more       Pseudophakia  Lenses WC, monitor          PLAN  Continue present management with drops for now      Pt states that her insurance will no longer conver [id number] --> will see if we can write a [id number] or prior authorization   She has preservative intolerance    RTC 3 HVF 24-2, OCT-RNFL         Chelsey Seals M.D., M.S.  Department of Ophthalmology   Division of Glaucoma Surgery  [facility]

## 2024-02-27 ENCOUNTER — OFFICE VISIT (OUTPATIENT)
Dept: OPHTHALMOLOGY | Facility: CLINIC | Age: 69
End: 2024-02-27
Payer: MEDICARE

## 2024-02-27 DIAGNOSIS — Q15.0 JUVENILE GLAUCOMA: ICD-10-CM

## 2024-02-27 DIAGNOSIS — H02.883 MEIBOMIAN GLAND DYSFUNCTION (MGD) OF BOTH EYES: ICD-10-CM

## 2024-02-27 DIAGNOSIS — Z96.1 PSEUDOPHAKIA OF BOTH EYES: ICD-10-CM

## 2024-02-27 DIAGNOSIS — H35.81 MACULAR EDEMA OF LEFT EYE: ICD-10-CM

## 2024-02-27 DIAGNOSIS — M35.01 KERATOCONJUNCTIVITIS SICCA: ICD-10-CM

## 2024-02-27 DIAGNOSIS — H54.10 BLINDNESS AND LOW VISION: ICD-10-CM

## 2024-02-27 DIAGNOSIS — H40.1133 PRIMARY OPEN ANGLE GLAUCOMA (POAG) OF BOTH EYES, SEVERE STAGE: ICD-10-CM

## 2024-02-27 DIAGNOSIS — H02.886 MEIBOMIAN GLAND DYSFUNCTION (MGD) OF BOTH EYES: ICD-10-CM

## 2024-02-27 DIAGNOSIS — H40.1123 PRIMARY OPEN ANGLE GLAUCOMA (POAG) OF LEFT EYE, SEVERE STAGE: Primary | ICD-10-CM

## 2024-02-27 PROCEDURE — 1160F RVW MEDS BY RX/DR IN RCRD: CPT | Mod: HCWC,CPTII,S$GLB, | Performed by: STUDENT IN AN ORGANIZED HEALTH CARE EDUCATION/TRAINING PROGRAM

## 2024-02-27 PROCEDURE — 1159F MED LIST DOCD IN RCRD: CPT | Mod: HCWC,CPTII,S$GLB, | Performed by: STUDENT IN AN ORGANIZED HEALTH CARE EDUCATION/TRAINING PROGRAM

## 2024-02-27 PROCEDURE — 99999 PR PBB SHADOW E&M-EST. PATIENT-LVL III: CPT | Mod: PBBFAC,HCWC,, | Performed by: STUDENT IN AN ORGANIZED HEALTH CARE EDUCATION/TRAINING PROGRAM

## 2024-02-27 PROCEDURE — 1101F PT FALLS ASSESS-DOCD LE1/YR: CPT | Mod: HCWC,CPTII,S$GLB, | Performed by: STUDENT IN AN ORGANIZED HEALTH CARE EDUCATION/TRAINING PROGRAM

## 2024-02-27 PROCEDURE — 99214 OFFICE O/P EST MOD 30 MIN: CPT | Mod: HCWC,S$GLB,, | Performed by: STUDENT IN AN ORGANIZED HEALTH CARE EDUCATION/TRAINING PROGRAM

## 2024-02-27 PROCEDURE — 3288F FALL RISK ASSESSMENT DOCD: CPT | Mod: HCWC,CPTII,S$GLB, | Performed by: STUDENT IN AN ORGANIZED HEALTH CARE EDUCATION/TRAINING PROGRAM

## 2024-02-27 PROCEDURE — 1126F AMNT PAIN NOTED NONE PRSNT: CPT | Mod: HCWC,CPTII,S$GLB, | Performed by: STUDENT IN AN ORGANIZED HEALTH CARE EDUCATION/TRAINING PROGRAM

## 2024-02-27 NOTE — Clinical Note
Can we investigate?  Pt states that her insurance will no longer conver [id number] --> will see if we can write a [id number] or prior authorization  She has preservative intolerance

## 2024-03-31 NOTE — TELEPHONE ENCOUNTER
----- Message from Kat Black sent at 11/30/2022  3:04 PM CST -----  Regarding: Questions  Pt wants to speak with a tech. [employer] called pt requesting a prior authorization for the pt's Xiidra. Or they would like the pt to receive a different brand of eye drops.      Ms. Lim @ [phone]     PMD

## 2024-05-24 NOTE — PROGRESS NOTES
Subjective:       Patient ID: Carina Rios is a 68 y.o. female.    Chief Complaint: Glaucoma    HPI    DLS: 2/27/2024, Dr. Seals    Pt presents today for HVF + OCT review.   Pt states no changes since last visit.     Eye Meds:  Cosopt BID OU  Alphagan QD OU  Rockaltan qHS OU  Restaris OU prn    Last edited by Bee Oneal MA on 5/28/2024 10:45 AM.               Assessment & Plan   Primary open angle glaucoma (POAG) of left eye, severe stage  -     Yates Visual Field - OU - Extended - Both Eyes  -     ALPHAGAN P 0.1 % Drop; Place 1 drop into both eyes 3 (three) times daily.  Dispense: 25 mL; Refill: 6  -     cycloSPORINE (RESTASIS) 0.05 % ophthalmic emulsion; Place 1 drop into both eyes 2 (two) times daily.  Dispense: 60 each; Refill: 11  -     dorzolamide-timolol, PF, (COSOPT PF) 2-0.5 % Dpet ophthalmic solution; Place 1 drop into both eyes 2 (two) times daily.  Dispense: 180 each; Refill: 3  -     ROCKLATAN 0.02-0.005 % Drop; Place 1 drop into both eyes every evening.  Dispense: 7.5 mL; Refill: 12    Blindness and low vision  -     Yates Visual Field - OU - Extended - Both Eyes  -     ALPHAGAN P 0.1 % Drop; Place 1 drop into both eyes 3 (three) times daily.  Dispense: 25 mL; Refill: 6  -     cycloSPORINE (RESTASIS) 0.05 % ophthalmic emulsion; Place 1 drop into both eyes 2 (two) times daily.  Dispense: 60 each; Refill: 11  -     dorzolamide-timolol, PF, (COSOPT PF) 2-0.5 % Dpet ophthalmic solution; Place 1 drop into both eyes 2 (two) times daily.  Dispense: 180 each; Refill: 3  -     ROCKLATAN 0.02-0.005 % Drop; Place 1 drop into both eyes every evening.  Dispense: 7.5 mL; Refill: 12    Juvenile glaucoma  -     Yates Visual Field - OU - Extended - Both Eyes  -     ALPHAGAN P 0.1 % Drop; Place 1 drop into both eyes 3 (three) times daily.  Dispense: 25 mL; Refill: 6  -     cycloSPORINE (RESTASIS) 0.05 % ophthalmic emulsion; Place 1 drop into both eyes 2 (two) times daily.  Dispense: 60 each; Refill:  11  -     dorzolamide-timolol, PF, (COSOPT PF) 2-0.5 % Dpet ophthalmic solution; Place 1 drop into both eyes 2 (two) times daily.  Dispense: 180 each; Refill: 3  -     ROCKLATAN 0.02-0.005 % Drop; Place 1 drop into both eyes every evening.  Dispense: 7.5 mL; Refill: 12    Macular edema of left eye  -     ALPHAGAN P 0.1 % Drop; Place 1 drop into both eyes 3 (three) times daily.  Dispense: 25 mL; Refill: 6  -     cycloSPORINE (RESTASIS) 0.05 % ophthalmic emulsion; Place 1 drop into both eyes 2 (two) times daily.  Dispense: 60 each; Refill: 11  -     dorzolamide-timolol, PF, (COSOPT PF) 2-0.5 % Dpet ophthalmic solution; Place 1 drop into both eyes 2 (two) times daily.  Dispense: 180 each; Refill: 3  -     ROCKLATAN 0.02-0.005 % Drop; Place 1 drop into both eyes every evening.  Dispense: 7.5 mL; Refill: 12    Primary open angle glaucoma (POAG) of both eyes, severe stage  -     ALPHAGAN P 0.1 % Drop; Place 1 drop into both eyes 3 (three) times daily.  Dispense: 25 mL; Refill: 6  -     cycloSPORINE (RESTASIS) 0.05 % ophthalmic emulsion; Place 1 drop into both eyes 2 (two) times daily.  Dispense: 60 each; Refill: 11  -     dorzolamide-timolol, PF, (COSOPT PF) 2-0.5 % Dpet ophthalmic solution; Place 1 drop into both eyes 2 (two) times daily.  Dispense: 180 each; Refill: 3  -     ROCKLATAN 0.02-0.005 % Drop; Place 1 drop into both eyes every evening.  Dispense: 7.5 mL; Refill: 12    Meibomian gland dysfunction (MGD) of both eyes  -     ALPHAGAN P 0.1 % Drop; Place 1 drop into both eyes 3 (three) times daily.  Dispense: 25 mL; Refill: 6  -     cycloSPORINE (RESTASIS) 0.05 % ophthalmic emulsion; Place 1 drop into both eyes 2 (two) times daily.  Dispense: 60 each; Refill: 11  -     dorzolamide-timolol, PF, (COSOPT PF) 2-0.5 % Dpet ophthalmic solution; Place 1 drop into both eyes 2 (two) times daily.  Dispense: 180 each; Refill: 3  -     ROCKLATAN 0.02-0.005 % Drop; Place 1 drop into both eyes every evening.  Dispense: 7.5 mL;  Refill: 12    Keratoconjunctivitis sicca  -     ALPHAGAN P 0.1 % Drop; Place 1 drop into both eyes 3 (three) times daily.  Dispense: 25 mL; Refill: 6  -     cycloSPORINE (RESTASIS) 0.05 % ophthalmic emulsion; Place 1 drop into both eyes 2 (two) times daily.  Dispense: 60 each; Refill: 11  -     dorzolamide-timolol, PF, (COSOPT PF) 2-0.5 % Dpet ophthalmic solution; Place 1 drop into both eyes 2 (two) times daily.  Dispense: 180 each; Refill: 3  -     ROCKLATAN 0.02-0.005 % Drop; Place 1 drop into both eyes every evening.  Dispense: 7.5 mL; Refill: 12    Pseudophakia of both eyes  -     ALPHAGAN P 0.1 % Drop; Place 1 drop into both eyes 3 (three) times daily.  Dispense: 25 mL; Refill: 6  -     cycloSPORINE (RESTASIS) 0.05 % ophthalmic emulsion; Place 1 drop into both eyes 2 (two) times daily.  Dispense: 60 each; Refill: 11  -     dorzolamide-timolol, PF, (COSOPT PF) 2-0.5 % Dpet ophthalmic solution; Place 1 drop into both eyes 2 (two) times daily.  Dispense: 180 each; Refill: 3  -     ROCKLATAN 0.02-0.005 % Drop; Place 1 drop into both eyes every evening.  Dispense: 7.5 mL; Refill: 12    Keratoconjunctivitis sicca not specified as Sjogren's, bilateral  -     ALPHAGAN P 0.1 % Drop; Place 1 drop into both eyes 3 (three) times daily.  Dispense: 25 mL; Refill: 6  -     cycloSPORINE (RESTASIS) 0.05 % ophthalmic emulsion; Place 1 drop into both eyes 2 (two) times daily.  Dispense: 60 each; Refill: 11  -     dorzolamide-timolol, PF, (COSOPT PF) 2-0.5 % Dpet ophthalmic solution; Place 1 drop into both eyes 2 (two) times daily.  Dispense: 180 each; Refill: 3  -     ROCKLATAN 0.02-0.005 % Drop; Place 1 drop into both eyes every evening.  Dispense: 7.5 mL; Refill: 12    Primary open angle glaucoma (POAG) of right eye, severe stage  -     ALPHAGAN P 0.1 % Drop; Place 1 drop into both eyes 3 (three) times daily.  Dispense: 25 mL; Refill: 6  -     cycloSPORINE (RESTASIS) 0.05 % ophthalmic emulsion; Place 1 drop into both eyes 2  (two) times daily.  Dispense: 60 each; Refill: 11  -     dorzolamide-timolol, PF, (COSOPT PF) 2-0.5 % Dpet ophthalmic solution; Place 1 drop into both eyes 2 (two) times daily.  Dispense: 180 each; Refill: 3  -     ROCKLATAN 0.02-0.005 % Drop; Place 1 drop into both eyes every evening.  Dispense: 7.5 mL; Refill: 12    Referral from Dr. Cool. Previously treated with Dr. Potter, Dr. Raymond.     POAG severe OU   JOAG - Dx'd at 20 y/o   -Fhx (+mtp family members), Steroids (-),Trauma(-)  -Drops: Rocklatan qHS OU // Dorzolamide-Timolol-PF BID (occ needs authorization) OU // Alphagan P TID OU // Restasis BID OU ==> All medications - 90-day [employer] mail order  -Drop intolerance/contraindication: regular Brimonidine redness, intolerant to diamox 500 ER (headache, nausea)  -Laser:  -Surgeries: Trab OU // IN BV OU // ST AVG OS // CE IOL OU  -CCT: 527 // 482 thin  -Gonio: 3+ with sclerostomies and scattered PAS  Tm 40-60?    5/23 HVF 24-2 Paracentral scotoma VFI 87% OD // severe depression VFI 4% OS --> +prog OD  5/23 RNFL dec TI B G/T OD // dec G/TS/TI/NI/N B T/NS OS --> stable OU  Macula wnl OD // edema OS    12/23 HVF10-2 ST defect and potential early SAD MD -6.51 OD only --> mild + prog    5/24 HVF 24-2 SAD and paracentral scotoma 83% OD //  extinguished VFI 0% OS--> +prog OD    We are alternating 24-2 and 10-2 q6 months. 24-2 in May. 10-2 in December.    Continue  Rocklatan qHS OU, dorzolamide-timolol-PF (was $0 for her)  Continue Alphagan P TID OU for now     Started diamox 500 ER BID --> did not tolerate --> will not continue       Red eye OU -- preservative intolerance  Multifactorial. Medicamentosa. MGD OU  Tried Lumify - not using, did not work  Restasis BID 11/22 OU  -- USE BID  Preservative free artifical tears 2-4x/day  Improved with PF Cosopt    Will try PF gtts      Macular edema OS  Chronic CME post AVG OS  Saw Dr. Simms - recommended to monitor       Allergic conjunctivitis OU  Not using Pataday any more        Pseudophakia  Lenses WC, monitor        PLAN  Continue present management with drops for now  Refilled all drops    Recommend repeat HVF to determine if new VF loss is accurate   Discussed options moving forward if need a lower IOP -- G6, ST tube, jenifer      RTC 1 month with HVF 24-2 OD - need to repeat -- refer to Dr. Matthew Seals M.D., M.S.  Department of Ophthalmology   Division of Glaucoma Surgery  [facility]

## 2024-05-28 ENCOUNTER — OFFICE VISIT (OUTPATIENT)
Dept: OPHTHALMOLOGY | Facility: CLINIC | Age: 69
End: 2024-05-28
Payer: MEDICARE

## 2024-05-28 ENCOUNTER — TELEPHONE (OUTPATIENT)
Dept: OPHTHALMOLOGY | Facility: CLINIC | Age: 69
End: 2024-05-28
Payer: MEDICARE

## 2024-05-28 DIAGNOSIS — H40.1113 PRIMARY OPEN ANGLE GLAUCOMA (POAG) OF RIGHT EYE, SEVERE STAGE: ICD-10-CM

## 2024-05-28 DIAGNOSIS — H54.10 BLINDNESS AND LOW VISION: ICD-10-CM

## 2024-05-28 DIAGNOSIS — H40.1123 PRIMARY OPEN ANGLE GLAUCOMA (POAG) OF LEFT EYE, SEVERE STAGE: Primary | ICD-10-CM

## 2024-05-28 DIAGNOSIS — H02.883 MEIBOMIAN GLAND DYSFUNCTION (MGD) OF BOTH EYES: ICD-10-CM

## 2024-05-28 DIAGNOSIS — H02.886 MEIBOMIAN GLAND DYSFUNCTION (MGD) OF BOTH EYES: ICD-10-CM

## 2024-05-28 DIAGNOSIS — H16.223 KERATOCONJUNCTIVITIS SICCA NOT SPECIFIED AS SJOGREN'S, BILATERAL: ICD-10-CM

## 2024-05-28 DIAGNOSIS — H40.1133 PRIMARY OPEN ANGLE GLAUCOMA (POAG) OF BOTH EYES, SEVERE STAGE: ICD-10-CM

## 2024-05-28 DIAGNOSIS — H35.81 MACULAR EDEMA OF LEFT EYE: ICD-10-CM

## 2024-05-28 DIAGNOSIS — Z96.1 PSEUDOPHAKIA OF BOTH EYES: ICD-10-CM

## 2024-05-28 DIAGNOSIS — M35.01 KERATOCONJUNCTIVITIS SICCA: ICD-10-CM

## 2024-05-28 DIAGNOSIS — Q15.0 JUVENILE GLAUCOMA: ICD-10-CM

## 2024-05-28 PROCEDURE — 1126F AMNT PAIN NOTED NONE PRSNT: CPT | Mod: HCWC,CPTII,S$GLB, | Performed by: STUDENT IN AN ORGANIZED HEALTH CARE EDUCATION/TRAINING PROGRAM

## 2024-05-28 PROCEDURE — 99214 OFFICE O/P EST MOD 30 MIN: CPT | Mod: HCWC,S$GLB,, | Performed by: STUDENT IN AN ORGANIZED HEALTH CARE EDUCATION/TRAINING PROGRAM

## 2024-05-28 PROCEDURE — 92083 EXTENDED VISUAL FIELD XM: CPT | Mod: HCWC,S$GLB,, | Performed by: STUDENT IN AN ORGANIZED HEALTH CARE EDUCATION/TRAINING PROGRAM

## 2024-05-28 PROCEDURE — 1159F MED LIST DOCD IN RCRD: CPT | Mod: HCWC,CPTII,S$GLB, | Performed by: STUDENT IN AN ORGANIZED HEALTH CARE EDUCATION/TRAINING PROGRAM

## 2024-05-28 PROCEDURE — 1160F RVW MEDS BY RX/DR IN RCRD: CPT | Mod: HCWC,CPTII,S$GLB, | Performed by: STUDENT IN AN ORGANIZED HEALTH CARE EDUCATION/TRAINING PROGRAM

## 2024-05-28 PROCEDURE — 3288F FALL RISK ASSESSMENT DOCD: CPT | Mod: HCWC,CPTII,S$GLB, | Performed by: STUDENT IN AN ORGANIZED HEALTH CARE EDUCATION/TRAINING PROGRAM

## 2024-05-28 PROCEDURE — 1101F PT FALLS ASSESS-DOCD LE1/YR: CPT | Mod: HCWC,CPTII,S$GLB, | Performed by: STUDENT IN AN ORGANIZED HEALTH CARE EDUCATION/TRAINING PROGRAM

## 2024-05-28 PROCEDURE — 99999 PR PBB SHADOW E&M-EST. PATIENT-LVL II: CPT | Mod: PBBFAC,HCWC,, | Performed by: STUDENT IN AN ORGANIZED HEALTH CARE EDUCATION/TRAINING PROGRAM

## 2024-05-28 RX ORDER — DORZOLAMIDE HYDROCHLORIDE AND TIMOLOL MALEATE PRESERVATIVE FREE 20; 5 MG/ML; MG/ML
1 SOLUTION/ DROPS OPHTHALMIC 2 TIMES DAILY
Qty: 180 EACH | Refills: 3 | Status: SHIPPED | OUTPATIENT
Start: 2024-05-28

## 2024-05-28 RX ORDER — BRIMONIDINE TARTRATE 1 MG/ML
1 SOLUTION/ DROPS OPHTHALMIC 3 TIMES DAILY
Qty: 25 ML | Refills: 6 | Status: SHIPPED | OUTPATIENT
Start: 2024-05-28

## 2024-05-28 RX ORDER — CYCLOSPORINE 0.5 MG/ML
1 EMULSION OPHTHALMIC 2 TIMES DAILY
Qty: 60 EACH | Refills: 11 | Status: SHIPPED | OUTPATIENT
Start: 2024-05-28 | End: 2025-05-28

## 2024-05-28 RX ORDER — NETARSUDIL AND LATANOPROST OPHTHALMIC SOLUTION, 0.02%/0.005% .2; .05 MG/ML; MG/ML
1 SOLUTION/ DROPS OPHTHALMIC; TOPICAL NIGHTLY
Qty: 7.5 ML | Refills: 12 | Status: SHIPPED | OUTPATIENT
Start: 2024-05-28 | End: 2025-05-28

## 2024-05-28 NOTE — TELEPHONE ENCOUNTER
Clinic spoke to patient to schedule new patient glaucoma evaluation with Dr. Castro per Dr. Seals. Patient declined to schedule appointment due to travel and asked for Dr. Seals to provide a list of glaucoma specialists that are closer to her home. Request forwarded to Dr. Seals for review per patient request.

## 2024-05-28 NOTE — TELEPHONE ENCOUNTER
----- Message from Haley Ferrer sent at 5/28/2024 11:26 AM CDT -----  Regarding: Appt request  Good morning, All     Dr. Seals is needing MsKortney Carinaandrés Rios seen (RTC 1 month with HVF 24-2 OD - need to repeat -- refer to Dr. Castro)     Ms. Carina Rios call back number is [phone]       Thank you.

## 2024-06-03 ENCOUNTER — TELEPHONE (OUTPATIENT)
Dept: OPHTHALMOLOGY | Facility: CLINIC | Age: 69
End: 2024-06-03
Payer: MEDICARE

## 2024-06-03 NOTE — TELEPHONE ENCOUNTER
----- Message from Chelsey Seals MD sent at 5/31/2024  8:44 AM CDT -----  Regarding: RE: Appt request  Dr. Rodríguez at [facility]    [phone]    Dr. Raymond at [facility]  [facility] office: [phone]  ----- Message -----  From: Virginie Luz  Sent: 5/29/2024   4:06 PM CDT  To: Chelsey Seals MD  Subject: FW: Appt request                                   ----- Message -----  From: Briana Wilson  Sent: 5/28/2024   4:30 PM CDT  To: Virginie Luz  Subject: FW: Appt request                                 Duong Rachel,    I spoke with patient Ms. Carina Rios to schedule a new patient appointment with Dr. Castro. She is requesting from Dr. Seals a list of glaucoma specialists that are closer to her home due to travel. I gave her the number here at [facility] is case she decides to schedule an appointment. Can you please ask Dr. Seals for a list of recommended specialists?    Thanks,    Briana  ----- Message -----  From: Haley Masterson  Sent: 5/28/2024  11:27 AM CDT  To: Cary Castro Staff  Subject: Appt request                                     Good morning, All     Dr. Seals is needing Ms. Carina Rios seen (RTC 1 month with HVF 24-2 OD - need to repeat -- refer to Dr. Castro)     Ms. Carina Rios call back number is [phone]       Thank you.

## 2024-06-24 ENCOUNTER — TELEPHONE (OUTPATIENT)
Dept: OPHTHALMOLOGY | Facility: CLINIC | Age: 69
End: 2024-06-24
Payer: MEDICARE

## 2024-06-24 NOTE — TELEPHONE ENCOUNTER
----- Message from Shara Reynolds MA sent at 6/20/2024  5:29 PM CDT -----  Contact: [phone]  The pt has to call her insurance and inform them that Dr Seals has moved out of state and she cannot get a referral    Shara  ----- Message -----  From: Virginie Luz  Sent: 6/18/2024   4:32 PM CDT  To: ISRA Francis,    I spoke with this patient earlier today. So patient was previously under Dr. Moore's care before seeing Dr. Seals. Now that Dr. Seals is no longer with [facility], she returned to Dr. Moore's care but was told insurance does not currently cover her visit but she just needs a referral from Dr. Seals sent to insurance to continue care with Dr. Moore. I am not sure how to go about this. Can you walk me through what to do, please?  ----- Message -----  From: Shara Reynolds MA  Sent: 6/17/2024   4:56 PM CDT  To: Virginie Luz      ----- Message -----  From: Masoud Etienne  Sent: 6/17/2024  12:21 PM CDT  To: Bozena Barbosa Staff    Pt is calling to see if she can get a referral to see Dr. Julieta Moore. She needed it sent to her insurance. Please call back to further assist.

## 2024-06-24 NOTE — TELEPHONE ENCOUNTER
Called patient to let her know management advised TOÑITO goodman know she will have to call insurance and let them know a referral will not be sent since Dr. Seals is out of state. I gave patient option to schedule with another one of out glaucoma specialists but declined. Advised patient to call back if she needs us to send notes or to schedule with us.

## 2024-08-21 ENCOUNTER — TELEPHONE (OUTPATIENT)
Dept: OPHTHALMOLOGY | Facility: CLINIC | Age: 69
End: 2024-08-21
Payer: MEDICARE

## 2024-08-21 NOTE — TELEPHONE ENCOUNTER
----- Message from Briana Wilson sent at 8/19/2024  3:03 PM CDT -----  Regarding: Dr. Castro  Contact: [phone]    ----- Message -----  From: Mulu Kelly  Sent: 8/19/2024   1:58 PM CDT  To: Cary Castro Staff    Patient is calling to reschedule appointment. Please call to assist. Unable to RS both Hump and appointment

## 2025-03-14 ENCOUNTER — TELEPHONE (OUTPATIENT)
Dept: ADMINISTRATIVE | Facility: CLINIC | Age: 70
End: 2025-03-14
Payer: MEDICARE

## 2025-03-14 NOTE — TELEPHONE ENCOUNTER
Called pt; informed pt I was calling to make sure pt's 3/21/25 home visit for pt's eawv appt still worked; pt stated she needed to cancel the appt due to peoples health coming to complete the visit; appt canceled per pt request